# Patient Record
Sex: MALE | Race: WHITE | NOT HISPANIC OR LATINO | Employment: OTHER | URBAN - METROPOLITAN AREA
[De-identification: names, ages, dates, MRNs, and addresses within clinical notes are randomized per-mention and may not be internally consistent; named-entity substitution may affect disease eponyms.]

---

## 2023-02-10 PROBLEM — L30.9 ECZEMA: Status: ACTIVE | Noted: 2023-02-10

## 2023-02-10 PROBLEM — E55.9 VITAMIN D DEFICIENCY: Status: ACTIVE | Noted: 2023-02-10

## 2023-02-10 PROBLEM — K57.90 DIVERTICULOSIS: Status: ACTIVE | Noted: 2023-02-10

## 2023-02-10 PROBLEM — F32.1 DEPRESSION, MAJOR, SINGLE EPISODE, MODERATE (MULTI): Status: ACTIVE | Noted: 2023-02-10

## 2023-02-10 PROBLEM — M19.041 OSTEOARTHRITIS OF FINGER OF RIGHT HAND: Status: ACTIVE | Noted: 2023-02-10

## 2023-02-10 PROBLEM — Z86.11 HISTORY OF TB (TUBERCULOSIS): Status: ACTIVE | Noted: 2023-02-10

## 2023-02-10 PROBLEM — E78.5 HYPERLIPIDEMIA: Status: ACTIVE | Noted: 2023-02-10

## 2023-02-10 PROBLEM — L81.8 PERIORBITAL HYPERPIGMENTATION: Status: ACTIVE | Noted: 2023-02-10

## 2023-02-10 PROBLEM — K63.5 COLON POLYPS: Status: ACTIVE | Noted: 2023-02-10

## 2023-02-10 PROBLEM — R05.9 COUGH: Status: ACTIVE | Noted: 2023-02-10

## 2023-02-10 PROBLEM — M19.042 OSTEOARTHRITIS OF FINGER OF LEFT HAND: Status: ACTIVE | Noted: 2023-02-10

## 2023-02-10 PROBLEM — E03.9 HYPOTHYROIDISM: Status: ACTIVE | Noted: 2023-02-10

## 2023-02-10 PROBLEM — R73.03 PREDIABETES: Status: ACTIVE | Noted: 2023-02-10

## 2023-02-10 PROBLEM — I10 HYPERTENSION: Status: ACTIVE | Noted: 2023-02-10

## 2023-02-10 PROBLEM — H43.391 VITREOUS FLOATERS OF RIGHT EYE: Status: ACTIVE | Noted: 2023-02-10

## 2023-02-10 PROBLEM — L03.211 FACIAL CELLULITIS: Status: ACTIVE | Noted: 2023-02-10

## 2023-02-10 PROBLEM — G56.02 CARPAL TUNNEL SYNDROME OF LEFT WRIST: Status: ACTIVE | Noted: 2023-02-10

## 2023-02-10 PROBLEM — J30.9 ALLERGIC RHINITIS: Status: ACTIVE | Noted: 2023-02-10

## 2023-02-10 PROBLEM — M67.449 GANGLION CYST OF FINGER: Status: ACTIVE | Noted: 2023-02-10

## 2023-02-10 PROBLEM — G56.01 CARPAL TUNNEL SYNDROME OF RIGHT WRIST: Status: ACTIVE | Noted: 2023-02-10

## 2023-02-10 PROBLEM — M19.049 HAND ARTHRITIS: Status: ACTIVE | Noted: 2023-02-10

## 2023-02-10 RX ORDER — MELOXICAM 15 MG/1
1 TABLET ORAL DAILY
COMMUNITY
Start: 2021-02-04 | End: 2024-01-30 | Stop reason: SDUPTHER

## 2023-02-10 RX ORDER — CHOLECALCIFEROL (VITAMIN D3) 50 MCG
1 TABLET ORAL DAILY
COMMUNITY
Start: 2020-06-18

## 2023-02-10 RX ORDER — ALBUTEROL SULFATE 90 UG/1
1-2 AEROSOL, METERED RESPIRATORY (INHALATION) EVERY 6 HOURS PRN
COMMUNITY
Start: 2022-06-26 | End: 2024-02-08 | Stop reason: WASHOUT

## 2023-02-10 RX ORDER — IPRATROPIUM BROMIDE 42 UG/1
2 SPRAY, METERED NASAL
COMMUNITY
Start: 2022-07-12 | End: 2024-02-08 | Stop reason: WASHOUT

## 2023-02-10 RX ORDER — SERTRALINE HYDROCHLORIDE 50 MG/1
1 TABLET, FILM COATED ORAL DAILY
COMMUNITY
Start: 2020-06-18 | End: 2024-02-26 | Stop reason: SDUPTHER

## 2023-02-10 RX ORDER — LISINOPRIL 20 MG/1
1 TABLET ORAL DAILY
COMMUNITY
Start: 2020-03-06 | End: 2023-03-23 | Stop reason: SDUPTHER

## 2023-02-10 RX ORDER — SERTRALINE HYDROCHLORIDE 100 MG/1
1 TABLET, FILM COATED ORAL DAILY
COMMUNITY
Start: 2020-06-18

## 2023-02-10 RX ORDER — ATORVASTATIN CALCIUM 40 MG/1
1 TABLET, FILM COATED ORAL NIGHTLY
COMMUNITY
Start: 2019-12-30 | End: 2023-05-25 | Stop reason: SDUPTHER

## 2023-02-10 RX ORDER — LEVOTHYROXINE SODIUM 200 UG/1
200 TABLET ORAL
COMMUNITY
Start: 2022-07-07 | End: 2023-03-23 | Stop reason: SDUPTHER

## 2023-03-20 ENCOUNTER — TELEPHONE (OUTPATIENT)
Dept: PRIMARY CARE | Facility: CLINIC | Age: 68
End: 2023-03-20
Payer: MEDICARE

## 2023-03-20 DIAGNOSIS — R73.03 PREDIABETES: ICD-10-CM

## 2023-03-20 DIAGNOSIS — E78.2 MIXED HYPERLIPIDEMIA: Primary | ICD-10-CM

## 2023-03-20 DIAGNOSIS — E03.9 HYPOTHYROIDISM, UNSPECIFIED TYPE: ICD-10-CM

## 2023-03-20 DIAGNOSIS — I10 PRIMARY HYPERTENSION: ICD-10-CM

## 2023-03-20 DIAGNOSIS — E55.9 VITAMIN D DEFICIENCY: ICD-10-CM

## 2023-03-20 NOTE — TELEPHONE ENCOUNTER
Patient has an appointment for March 23. He called to see f you want labs drawn prior to this visit.

## 2023-03-20 NOTE — TELEPHONE ENCOUNTER
Yes labs ordered although he should have had orders from his last visit new orders have been placed they should be fasting and try to do them prior to his appointment.

## 2023-03-23 ENCOUNTER — OFFICE VISIT (OUTPATIENT)
Dept: PRIMARY CARE | Facility: CLINIC | Age: 68
End: 2023-03-23
Payer: MEDICARE

## 2023-03-23 VITALS
SYSTOLIC BLOOD PRESSURE: 113 MMHG | DIASTOLIC BLOOD PRESSURE: 74 MMHG | BODY MASS INDEX: 26.8 KG/M2 | WEIGHT: 187.2 LBS | RESPIRATION RATE: 17 BRPM | HEART RATE: 70 BPM | OXYGEN SATURATION: 96 % | TEMPERATURE: 97.6 F | HEIGHT: 70 IN

## 2023-03-23 DIAGNOSIS — I10 PRIMARY HYPERTENSION: ICD-10-CM

## 2023-03-23 DIAGNOSIS — E03.9 HYPOTHYROIDISM, UNSPECIFIED TYPE: ICD-10-CM

## 2023-03-23 DIAGNOSIS — Z23 ENCOUNTER FOR IMMUNIZATION: ICD-10-CM

## 2023-03-23 DIAGNOSIS — K42.9 UMBILICAL HERNIA WITHOUT OBSTRUCTION AND WITHOUT GANGRENE: ICD-10-CM

## 2023-03-23 DIAGNOSIS — Z00.00 MEDICARE ANNUAL WELLNESS VISIT, INITIAL: Primary | ICD-10-CM

## 2023-03-23 DIAGNOSIS — Z12.5 SCREENING FOR PROSTATE CANCER: ICD-10-CM

## 2023-03-23 DIAGNOSIS — R73.03 PREDIABETES: ICD-10-CM

## 2023-03-23 DIAGNOSIS — K43.2 INCISIONAL HERNIA, WITHOUT OBSTRUCTION OR GANGRENE: ICD-10-CM

## 2023-03-23 DIAGNOSIS — E78.2 MIXED HYPERLIPIDEMIA: ICD-10-CM

## 2023-03-23 DIAGNOSIS — F32.1 DEPRESSION, MAJOR, SINGLE EPISODE, MODERATE (MULTI): ICD-10-CM

## 2023-03-23 DIAGNOSIS — E55.9 VITAMIN D DEFICIENCY: ICD-10-CM

## 2023-03-23 PROBLEM — R05.9 COUGH: Status: RESOLVED | Noted: 2023-02-10 | Resolved: 2023-03-23

## 2023-03-23 PROBLEM — L03.211 FACIAL CELLULITIS: Status: RESOLVED | Noted: 2023-02-10 | Resolved: 2023-03-23

## 2023-03-23 PROCEDURE — 99214 OFFICE O/P EST MOD 30 MIN: CPT | Performed by: FAMILY MEDICINE

## 2023-03-23 PROCEDURE — 1170F FXNL STATUS ASSESSED: CPT | Performed by: FAMILY MEDICINE

## 2023-03-23 PROCEDURE — 3074F SYST BP LT 130 MM HG: CPT | Performed by: FAMILY MEDICINE

## 2023-03-23 PROCEDURE — 1159F MED LIST DOCD IN RCRD: CPT | Performed by: FAMILY MEDICINE

## 2023-03-23 PROCEDURE — G0009 ADMIN PNEUMOCOCCAL VACCINE: HCPCS | Performed by: FAMILY MEDICINE

## 2023-03-23 PROCEDURE — G0402 INITIAL PREVENTIVE EXAM: HCPCS | Performed by: FAMILY MEDICINE

## 2023-03-23 PROCEDURE — 90671 PCV15 VACCINE IM: CPT | Performed by: FAMILY MEDICINE

## 2023-03-23 PROCEDURE — 3078F DIAST BP <80 MM HG: CPT | Performed by: FAMILY MEDICINE

## 2023-03-23 PROCEDURE — 1036F TOBACCO NON-USER: CPT | Performed by: FAMILY MEDICINE

## 2023-03-23 PROCEDURE — 1160F RVW MEDS BY RX/DR IN RCRD: CPT | Performed by: FAMILY MEDICINE

## 2023-03-23 RX ORDER — LEVOTHYROXINE SODIUM 50 UG/1
TABLET ORAL EVERY 24 HOURS
COMMUNITY
End: 2023-03-23 | Stop reason: SDUPTHER

## 2023-03-23 RX ORDER — LEVOTHYROXINE SODIUM 200 UG/1
200 TABLET ORAL DAILY
Qty: 30 TABLET | Refills: 11 | Status: SHIPPED | OUTPATIENT
Start: 2023-03-23 | End: 2023-04-03 | Stop reason: ALTCHOICE

## 2023-03-23 RX ORDER — LEVOTHYROXINE SODIUM 50 UG/1
50 TABLET ORAL EVERY 24 HOURS
Qty: 30 TABLET | Refills: 11 | Status: SHIPPED | OUTPATIENT
Start: 2023-03-23 | End: 2023-04-03 | Stop reason: ALTCHOICE

## 2023-03-23 RX ORDER — LISINOPRIL 20 MG/1
20 TABLET ORAL DAILY
Qty: 30 TABLET | Refills: 11 | Status: SHIPPED | OUTPATIENT
Start: 2023-03-23 | End: 2024-02-08 | Stop reason: SDUPTHER

## 2023-03-23 ASSESSMENT — ACTIVITIES OF DAILY LIVING (ADL)
BATHING: INDEPENDENT
PREPARING MEALS: INDEPENDENT
BATHING: INDEPENDENT
DOING_HOUSEWORK: INDEPENDENT
PILL BOX USED: NO
USING TELEPHONE: INDEPENDENT
TAKING_MEDICATION: INDEPENDENT
EATING: INDEPENDENT
DRESSING: INDEPENDENT
MANAGING_FINANCES: INDEPENDENT
DRESSING: INDEPENDENT
GROCERY_SHOPPING: INDEPENDENT
ADEQUATE_TO_COMPLETE_ADL: YES
TOILETING: INDEPENDENT
FEEDING: INDEPENDENT
TAKING MEDICATION: INDEPENDENT
JUDGMENT_ADEQUATE_SAFELY_COMPLETE_DAILY_ACTIVITIES: YES
USING TRANSPORTATION: INDEPENDENT
STIL DRIVING: YES
DOING HOUSEWORK: INDEPENDENT
NEEDS ASSISTANCE WITH FOOD: INDEPENDENT
GROCERY SHOPPING: INDEPENDENT
MANAGING FINANCES: INDEPENDENT

## 2023-03-23 ASSESSMENT — ENCOUNTER SYMPTOMS
LOSS OF SENSATION IN FEET: 0
DEPRESSION: 0
OCCASIONAL FEELINGS OF UNSTEADINESS: 0
CONFUSION: 0
FEVER: 0
ARTHRALGIAS: 0
SHORTNESS OF BREATH: 0
CHILLS: 0
CHEST TIGHTNESS: 0
ABDOMINAL PAIN: 0
PALPITATIONS: 0

## 2023-03-23 ASSESSMENT — GERIATRIC MINI NUTRITIONAL ASSESSMENT (MNA)
E NEUROPSYCHOLOGICAL PROBLEMS: SEVERE DEMENTIA OR DEPRESSION
C GENERAL MOBILITY: BED OR CHAIR BOUND
D HAS SUFFERED PSYCHOLOGICAL STRESS OR ACUTE DISEASE IN THE PAST 3 MONTHS?: NO
B WEIGHT LOSS DURING THE LAST 3 MONTHS: WEIGHT LOSS GREATER THAN 3 KG (6.6 LBS)
A HAS FOOD INTAKE DECLINED OVER THE PAST 3 MONTHS DUE TO LOSS OF APPETITE, DIGESTIVE PROBLEMS, CHEWING OR SWALLOWING DIFFICULTIES?: SEVERE DECREASE IN FOOD INTAKE

## 2023-03-23 ASSESSMENT — PATIENT HEALTH QUESTIONNAIRE - PHQ9
2. FEELING DOWN, DEPRESSED OR HOPELESS: NOT AT ALL
SUM OF ALL RESPONSES TO PHQ9 QUESTIONS 1 AND 2: 0
1. LITTLE INTEREST OR PLEASURE IN DOING THINGS: NOT AT ALL

## 2023-03-23 ASSESSMENT — COLUMBIA-SUICIDE SEVERITY RATING SCALE - C-SSRS
1. IN THE PAST MONTH, HAVE YOU WISHED YOU WERE DEAD OR WISHED YOU COULD GO TO SLEEP AND NOT WAKE UP?: NO
2. HAVE YOU ACTUALLY HAD ANY THOUGHTS OF KILLING YOURSELF?: NO
6. HAVE YOU EVER DONE ANYTHING, STARTED TO DO ANYTHING, OR PREPARED TO DO ANYTHING TO END YOUR LIFE?: NO

## 2023-03-23 NOTE — PROGRESS NOTES
"Subjective   Reason for Visit: Sher Cameron is an 67 y.o. male here for a Medicare Wellness visit.     Past Medical, Surgical, and Family History reviewed and updated in chart.    Reviewed all medications by prescribing practitioner or clinical pharmacist (such as prescriptions, OTCs, herbal therapies and supplements) and documented in the medical record.    HPI patient today for follow-up of ongoing healthcare issues and review of recent lab work.  He states that he notices a bump right mid abdominal region.  Its been there for years.  Does not only bother him but he wanted to see what it might be.  Has not gotten any larger in size and does not cause him any pain.  Of note is he had previous surgery for a ruptured esophagus years ago and there is a vertical midline incision on the abdominal wall.    Patient Care Team:  Zach Angela MD as PCP - General  LENNY Durbin-CNP as PCP - [facility] Attributed Provider     Review of Systems   Constitutional:  Negative for chills and fever.   HENT:  Negative for congestion and ear pain.    Eyes:  Negative for visual disturbance.   Respiratory:  Negative for chest tightness and shortness of breath.    Cardiovascular:  Negative for chest pain and palpitations.   Gastrointestinal:  Negative for abdominal pain.   Musculoskeletal:  Negative for arthralgias.   Skin:  Negative for pallor.   Psychiatric/Behavioral:  Negative for confusion.        Objective   Vitals:  /74 (BP Location: Left arm, Patient Position: Sitting, BP Cuff Size: Large adult)   Pulse 70   Temp 36.4 °C (97.6 °F)   Resp 17   Ht 1.778 m (5' 10\")   Wt 84.9 kg (187 lb 3.2 oz)   SpO2 96%   BMI 26.86 kg/m²       Physical Exam  Vitals and nursing note reviewed.   Constitutional:       General: He is not in acute distress.     Appearance: Normal appearance. He is not ill-appearing.   HENT:      Head: Normocephalic and atraumatic.      Right Ear: Tympanic membrane, ear canal and external ear " normal.      Left Ear: Tympanic membrane, ear canal and external ear normal.      Mouth/Throat:      Pharynx: Oropharynx is clear.   Eyes:      Extraocular Movements: Extraocular movements intact.   Cardiovascular:      Rate and Rhythm: Normal rate and regular rhythm.      Pulses: Normal pulses.      Heart sounds: Normal heart sounds.   Pulmonary:      Effort: Pulmonary effort is normal.      Breath sounds: Normal breath sounds.   Abdominal:      General: Abdomen is flat. Bowel sounds are normal.      Palpations: Abdomen is soft. There is no mass.      Tenderness: There is no abdominal tenderness. There is no guarding or rebound.      Hernia: A hernia is present.      Comments: Easily reducible umbilical hernia  Just to the right of midline and adjacent to his incisional scar is not soft easily reducible mass.   Musculoskeletal:         General: Normal range of motion.      Cervical back: Neck supple.   Skin:     General: Skin is warm.   Neurological:      Mental Status: He is alert and oriented to person, place, and time. Mental status is at baseline.   Psychiatric:         Mood and Affect: Mood normal.       Return to office in 4 months with repeat fasting labs  Await further input from general surgery with regards to umbilical hernia and suspected abdominal wall/incisional hernia  Assessment/Plan   Problem List Items Addressed This Visit       Depression, major, single episode, moderate (CMS/HCC)    Current Assessment & Plan     Stable continue Zoloft 100 mg daily         Hyperlipidemia    Current Assessment & Plan     Stable continue Lipitor 40 mg daily         Relevant Orders    Comprehensive Metabolic Panel    Lipid Panel    Hypertension    Current Assessment & Plan     Stable continue current treatment         Relevant Medications    lisinopril 20 mg tablet    Other Relevant Orders    CBC    Comprehensive Metabolic Panel    Follow Up In Primary Care    Hypothyroidism    Current Assessment & Plan     Stable  continue current doses of levothyroxine for total of 250 mcg daily         Relevant Medications    levothyroxine (Synthroid, Levoxyl) 200 mcg tablet    levothyroxine (Synthroid, Levoxyl) 50 mcg tablet    Other Relevant Orders    TSH with reflex to Free T4 if abnormal    Prediabetes    Current Assessment & Plan     A1c stable continue dietary modifications         Relevant Orders    CBC    Comprehensive Metabolic Panel    Hemoglobin A1C    Follow Up In Primary Care    Vitamin D deficiency    Current Assessment & Plan     Continue to monitor and supplement         Relevant Orders    Vitamin D 1,25 Dihydroxy    Umbilical hernia without obstruction and without gangrene    Current Assessment & Plan     Refer to general surgeon for second opinion  We discussed signs to watch out for that would warrant immediate attention and visit to ER.         Relevant Orders    Referral to General Surgery    Incisional hernia, without obstruction or gangrene    Current Assessment & Plan     Refer to general surgeon for second opinion.  Call or go to the ER if anything should worsen.         Relevant Orders    Referral to General Surgery    Screening for prostate cancer    Current Assessment & Plan     Screening PSA with next lab draw         Relevant Orders    Prostate Specific Antigen, Screen    Encounter for immunization    Relevant Orders    Pneumococcal conjugate vaccine, 15-valent (VAXNEUVANCE) (Completed)    Medicare annual wellness visit, initial - Primary    Current Assessment & Plan     Prevnar 15x1 today  Pneumo 23 7/20/2021  Flu vaccine 11/18/2022  Tdap 7/20/2021  Colonoscopy 8/8/2019  PSA 7/6/2022

## 2023-03-24 NOTE — ASSESSMENT & PLAN NOTE
Refer to general surgeon for second opinion  We discussed signs to watch out for that would warrant immediate attention and visit to ER.

## 2023-03-24 NOTE — ASSESSMENT & PLAN NOTE
Prevnar 15x1 today  Pneumo 23 7/20/2021  Flu vaccine 11/18/2022  Tdap 7/20/2021  Colonoscopy 8/8/2019  PSA 7/6/2022

## 2023-03-30 ENCOUNTER — LAB (OUTPATIENT)
Dept: LAB | Facility: LAB | Age: 68
End: 2023-03-30
Payer: MEDICARE

## 2023-03-30 DIAGNOSIS — R73.03 PREDIABETES: ICD-10-CM

## 2023-03-30 DIAGNOSIS — I10 PRIMARY HYPERTENSION: ICD-10-CM

## 2023-03-30 DIAGNOSIS — E78.2 MIXED HYPERLIPIDEMIA: ICD-10-CM

## 2023-03-30 DIAGNOSIS — E55.9 VITAMIN D DEFICIENCY: ICD-10-CM

## 2023-03-30 DIAGNOSIS — E03.9 HYPOTHYROIDISM, UNSPECIFIED TYPE: ICD-10-CM

## 2023-03-30 LAB
ALANINE AMINOTRANSFERASE (SGPT) (U/L) IN SER/PLAS: 22 U/L (ref 10–52)
ALBUMIN (G/DL) IN SER/PLAS: 4 G/DL (ref 3.4–5)
ALKALINE PHOSPHATASE (U/L) IN SER/PLAS: 42 U/L (ref 33–136)
ANION GAP IN SER/PLAS: 7 MMOL/L (ref 10–20)
ASPARTATE AMINOTRANSFERASE (SGOT) (U/L) IN SER/PLAS: 19 U/L (ref 9–39)
BILIRUBIN TOTAL (MG/DL) IN SER/PLAS: 0.6 MG/DL (ref 0–1.2)
CALCIUM (MG/DL) IN SER/PLAS: 8.8 MG/DL (ref 8.6–10.3)
CARBON DIOXIDE, TOTAL (MMOL/L) IN SER/PLAS: 31 MMOL/L (ref 21–32)
CHLORIDE (MMOL/L) IN SER/PLAS: 108 MMOL/L (ref 98–107)
CHOLESTEROL (MG/DL) IN SER/PLAS: 129 MG/DL (ref 0–199)
CHOLESTEROL IN HDL (MG/DL) IN SER/PLAS: 44.8 MG/DL
CHOLESTEROL/HDL RATIO: 2.9
CREATININE (MG/DL) IN SER/PLAS: 0.87 MG/DL (ref 0.5–1.3)
ESTIMATED AVERAGE GLUCOSE FOR HBA1C: 123 MG/DL
GFR MALE: >90 ML/MIN/1.73M2
GLUCOSE (MG/DL) IN SER/PLAS: 92 MG/DL (ref 74–99)
HEMOGLOBIN A1C/HEMOGLOBIN TOTAL IN BLOOD: 5.9 %
LDL: 68 MG/DL (ref 0–99)
POTASSIUM (MMOL/L) IN SER/PLAS: 4.1 MMOL/L (ref 3.5–5.3)
PROTEIN TOTAL: 6.2 G/DL (ref 6.4–8.2)
SODIUM (MMOL/L) IN SER/PLAS: 142 MMOL/L (ref 136–145)
THYROTROPIN (MIU/L) IN SER/PLAS BY DETECTION LIMIT <= 0.05 MIU/L: 0.1 MIU/L (ref 0.44–3.98)
THYROXINE (T4) FREE (NG/DL) IN SER/PLAS: 1.03 NG/DL (ref 0.61–1.12)
TRIGLYCERIDE (MG/DL) IN SER/PLAS: 79 MG/DL (ref 0–149)
UREA NITROGEN (MG/DL) IN SER/PLAS: 16 MG/DL (ref 6–23)
VLDL: 16 MG/DL (ref 0–40)

## 2023-03-30 PROCEDURE — 80061 LIPID PANEL: CPT

## 2023-03-30 PROCEDURE — 84439 ASSAY OF FREE THYROXINE: CPT

## 2023-03-30 PROCEDURE — 84443 ASSAY THYROID STIM HORMONE: CPT

## 2023-03-30 PROCEDURE — 80053 COMPREHEN METABOLIC PANEL: CPT

## 2023-03-30 PROCEDURE — 82652 VIT D 1 25-DIHYDROXY: CPT

## 2023-03-30 PROCEDURE — 83036 HEMOGLOBIN GLYCOSYLATED A1C: CPT

## 2023-03-30 PROCEDURE — 36415 COLL VENOUS BLD VENIPUNCTURE: CPT

## 2023-03-31 ENCOUNTER — TELEPHONE (OUTPATIENT)
Dept: PRIMARY CARE | Facility: CLINIC | Age: 68
End: 2023-03-31
Payer: MEDICARE

## 2023-03-31 DIAGNOSIS — E03.9 HYPOTHYROIDISM, UNSPECIFIED TYPE: ICD-10-CM

## 2023-04-03 LAB — VITAMIN D 1,25-DIHYDROXY: 39.7 PG/ML (ref 19.9–79.3)

## 2023-04-03 RX ORDER — LEVOTHYROXINE SODIUM 175 UG/1
175 TABLET ORAL
Qty: 90 TABLET | Refills: 1 | Status: SHIPPED | OUTPATIENT
Start: 2023-04-03 | End: 2023-07-26 | Stop reason: DRUGHIGH

## 2023-04-03 RX ORDER — LEVOTHYROXINE SODIUM 175 UG/1
175 TABLET ORAL
COMMUNITY
End: 2023-04-03 | Stop reason: ALTCHOICE

## 2023-04-03 NOTE — TELEPHONE ENCOUNTER
Then based on recent lab results have him decrease his levothyroxine to 175 mcg once a day please pend new prescription.  And update medication list.  Must keep office appointment as scheduled with repeat lab work a week before.

## 2023-05-25 ENCOUNTER — TELEPHONE (OUTPATIENT)
Dept: PRIMARY CARE | Facility: CLINIC | Age: 68
End: 2023-05-25
Payer: MEDICARE

## 2023-05-25 DIAGNOSIS — E78.49 OTHER HYPERLIPIDEMIA: ICD-10-CM

## 2023-05-25 RX ORDER — ATORVASTATIN CALCIUM 40 MG/1
40 TABLET, FILM COATED ORAL NIGHTLY
Qty: 90 TABLET | Refills: 3 | Status: SHIPPED | OUTPATIENT
Start: 2023-05-25

## 2023-05-25 NOTE — TELEPHONE ENCOUNTER
Sher left a voicemail message for a refill on the following:    Atorvastatin Calcium 40mg 1 tablet at bedtime    [facility]: [facility]    Last seen: 3/23/2023  Future appointment: 7/26/2023

## 2023-07-25 ENCOUNTER — LAB (OUTPATIENT)
Dept: LAB | Facility: LAB | Age: 68
End: 2023-07-25
Payer: MEDICARE

## 2023-07-25 DIAGNOSIS — E03.9 HYPOTHYROIDISM, UNSPECIFIED TYPE: ICD-10-CM

## 2023-07-25 DIAGNOSIS — R73.03 PREDIABETES: ICD-10-CM

## 2023-07-25 DIAGNOSIS — E55.9 VITAMIN D DEFICIENCY: ICD-10-CM

## 2023-07-25 DIAGNOSIS — Z12.5 SCREENING FOR PROSTATE CANCER: ICD-10-CM

## 2023-07-25 DIAGNOSIS — E78.2 MIXED HYPERLIPIDEMIA: ICD-10-CM

## 2023-07-25 DIAGNOSIS — I10 PRIMARY HYPERTENSION: ICD-10-CM

## 2023-07-25 LAB
ALANINE AMINOTRANSFERASE (SGPT) (U/L) IN SER/PLAS: 23 U/L (ref 10–52)
ALBUMIN (G/DL) IN SER/PLAS: 4 G/DL (ref 3.4–5)
ALKALINE PHOSPHATASE (U/L) IN SER/PLAS: 40 U/L (ref 33–136)
ANION GAP IN SER/PLAS: 13 MMOL/L (ref 10–20)
ASPARTATE AMINOTRANSFERASE (SGOT) (U/L) IN SER/PLAS: 21 U/L (ref 9–39)
BILIRUBIN TOTAL (MG/DL) IN SER/PLAS: 0.6 MG/DL (ref 0–1.2)
CALCIUM (MG/DL) IN SER/PLAS: 8.6 MG/DL (ref 8.6–10.3)
CARBON DIOXIDE, TOTAL (MMOL/L) IN SER/PLAS: 26 MMOL/L (ref 21–32)
CHLORIDE (MMOL/L) IN SER/PLAS: 106 MMOL/L (ref 98–107)
CHOLESTEROL (MG/DL) IN SER/PLAS: 134 MG/DL (ref 0–199)
CHOLESTEROL IN HDL (MG/DL) IN SER/PLAS: 50.7 MG/DL
CHOLESTEROL/HDL RATIO: 2.6
CREATININE (MG/DL) IN SER/PLAS: 0.9 MG/DL (ref 0.5–1.3)
ERYTHROCYTE DISTRIBUTION WIDTH (RATIO) BY AUTOMATED COUNT: 14.1 % (ref 11.5–14.5)
ERYTHROCYTE MEAN CORPUSCULAR HEMOGLOBIN CONCENTRATION (G/DL) BY AUTOMATED: 32 G/DL (ref 32–36)
ERYTHROCYTE MEAN CORPUSCULAR VOLUME (FL) BY AUTOMATED COUNT: 82 FL (ref 80–100)
ERYTHROCYTES (10*6/UL) IN BLOOD BY AUTOMATED COUNT: 5.29 X10E12/L (ref 4.5–5.9)
ESTIMATED AVERAGE GLUCOSE FOR HBA1C: 131 MG/DL
GFR MALE: >90 ML/MIN/1.73M2
GLUCOSE (MG/DL) IN SER/PLAS: 91 MG/DL (ref 74–99)
HEMATOCRIT (%) IN BLOOD BY AUTOMATED COUNT: 43.5 % (ref 41–52)
HEMOGLOBIN (G/DL) IN BLOOD: 13.9 G/DL (ref 13.5–17.5)
HEMOGLOBIN A1C/HEMOGLOBIN TOTAL IN BLOOD: 6.2 %
LDL: 69 MG/DL (ref 0–99)
LEUKOCYTES (10*3/UL) IN BLOOD BY AUTOMATED COUNT: 5.1 X10E9/L (ref 4.4–11.3)
PLATELETS (10*3/UL) IN BLOOD AUTOMATED COUNT: 162 X10E9/L (ref 150–450)
POTASSIUM (MMOL/L) IN SER/PLAS: 4.2 MMOL/L (ref 3.5–5.3)
PROSTATE SPECIFIC ANTIGEN,SCREEN: 0.3 NG/ML (ref 0–4)
PROTEIN TOTAL: 6.1 G/DL (ref 6.4–8.2)
SODIUM (MMOL/L) IN SER/PLAS: 141 MMOL/L (ref 136–145)
THYROTROPIN (MIU/L) IN SER/PLAS BY DETECTION LIMIT <= 0.05 MIU/L: 0.13 MIU/L (ref 0.44–3.98)
THYROXINE (T4) FREE (NG/DL) IN SER/PLAS: 1.08 NG/DL (ref 0.61–1.12)
TRIGLYCERIDE (MG/DL) IN SER/PLAS: 74 MG/DL (ref 0–149)
UREA NITROGEN (MG/DL) IN SER/PLAS: 16 MG/DL (ref 6–23)
VLDL: 15 MG/DL (ref 0–40)

## 2023-07-25 PROCEDURE — 80053 COMPREHEN METABOLIC PANEL: CPT

## 2023-07-25 PROCEDURE — 83036 HEMOGLOBIN GLYCOSYLATED A1C: CPT

## 2023-07-25 PROCEDURE — G0103 PSA SCREENING: HCPCS

## 2023-07-25 PROCEDURE — 84439 ASSAY OF FREE THYROXINE: CPT

## 2023-07-25 PROCEDURE — 80061 LIPID PANEL: CPT

## 2023-07-25 PROCEDURE — 36415 COLL VENOUS BLD VENIPUNCTURE: CPT

## 2023-07-25 PROCEDURE — 82652 VIT D 1 25-DIHYDROXY: CPT

## 2023-07-25 PROCEDURE — 85027 COMPLETE CBC AUTOMATED: CPT

## 2023-07-25 PROCEDURE — 84443 ASSAY THYROID STIM HORMONE: CPT

## 2023-07-26 ENCOUNTER — OFFICE VISIT (OUTPATIENT)
Dept: PRIMARY CARE | Facility: CLINIC | Age: 68
End: 2023-07-26
Payer: MEDICARE

## 2023-07-26 VITALS
DIASTOLIC BLOOD PRESSURE: 78 MMHG | TEMPERATURE: 97.8 F | BODY MASS INDEX: 26.66 KG/M2 | OXYGEN SATURATION: 95 % | SYSTOLIC BLOOD PRESSURE: 121 MMHG | WEIGHT: 186.2 LBS | HEIGHT: 70 IN | HEART RATE: 67 BPM

## 2023-07-26 DIAGNOSIS — E78.2 MIXED HYPERLIPIDEMIA: ICD-10-CM

## 2023-07-26 DIAGNOSIS — F32.1 DEPRESSION, MAJOR, SINGLE EPISODE, MODERATE (MULTI): Primary | ICD-10-CM

## 2023-07-26 DIAGNOSIS — E55.9 VITAMIN D DEFICIENCY: ICD-10-CM

## 2023-07-26 DIAGNOSIS — R73.03 PREDIABETES: ICD-10-CM

## 2023-07-26 DIAGNOSIS — E03.8 OTHER SPECIFIED HYPOTHYROIDISM: ICD-10-CM

## 2023-07-26 DIAGNOSIS — L30.8 OTHER ECZEMA: ICD-10-CM

## 2023-07-26 DIAGNOSIS — I10 PRIMARY HYPERTENSION: ICD-10-CM

## 2023-07-26 PROCEDURE — 1160F RVW MEDS BY RX/DR IN RCRD: CPT | Performed by: FAMILY MEDICINE

## 2023-07-26 PROCEDURE — 1036F TOBACCO NON-USER: CPT | Performed by: FAMILY MEDICINE

## 2023-07-26 PROCEDURE — 3074F SYST BP LT 130 MM HG: CPT | Performed by: FAMILY MEDICINE

## 2023-07-26 PROCEDURE — 1159F MED LIST DOCD IN RCRD: CPT | Performed by: FAMILY MEDICINE

## 2023-07-26 PROCEDURE — 3078F DIAST BP <80 MM HG: CPT | Performed by: FAMILY MEDICINE

## 2023-07-26 PROCEDURE — 99214 OFFICE O/P EST MOD 30 MIN: CPT | Performed by: FAMILY MEDICINE

## 2023-07-26 RX ORDER — LEVOTHYROXINE SODIUM 150 UG/1
150 TABLET ORAL DAILY
Qty: 90 TABLET | Refills: 1 | Status: SHIPPED | OUTPATIENT
Start: 2023-07-26 | End: 2024-01-30 | Stop reason: SDUPTHER

## 2023-07-26 RX ORDER — TRIAMCINOLONE ACETONIDE 1 MG/G
CREAM TOPICAL 2 TIMES DAILY
Qty: 80 G | Refills: 0 | Status: SHIPPED | OUTPATIENT
Start: 2023-07-26

## 2023-07-26 ASSESSMENT — ENCOUNTER SYMPTOMS
CONFUSION: 0
ABDOMINAL PAIN: 0
FEVER: 0
PALPITATIONS: 0
SHORTNESS OF BREATH: 0
ARTHRALGIAS: 0
CHEST TIGHTNESS: 0
CHILLS: 0

## 2023-07-26 NOTE — PROGRESS NOTES
"Subjective   Patient ID: Sher Cameron is a 68 y.o. male who presents for Follow-up (4 months with labs).    HPI   Patient today for follow-up of ongoing healthcare issues and review of blood work.  He states he never made the appointment with a general surgeon needed the phone number to call to schedule.  Also he says he recently went to urgent care with itchy rash lateral right periorbital region.  He says he was told to use over-the-counter cortisone cream which she has been using but not getting much results.  He denies any other visual or ocular symptoms.  Review of Systems   Constitutional:  Negative for chills and fever.   HENT:  Negative for congestion and ear pain.    Eyes:  Negative for visual disturbance.   Respiratory:  Negative for chest tightness and shortness of breath.    Cardiovascular:  Negative for chest pain and palpitations.   Gastrointestinal:  Negative for abdominal pain.   Musculoskeletal:  Negative for arthralgias.   Skin:  Positive for rash. Negative for pallor.   Psychiatric/Behavioral:  Negative for confusion.        Objective   /78   Pulse 67   Temp 36.6 °C (97.8 °F)   Ht 1.778 m (5' 10\")   Wt 84.5 kg (186 lb 3.2 oz)   SpO2 95%   BMI 26.72 kg/m²     Physical Exam  Vitals and nursing note reviewed.   Constitutional:       General: He is not in acute distress.     Appearance: Normal appearance. He is not ill-appearing.   HENT:      Head: Normocephalic and atraumatic.      Right Ear: Tympanic membrane, ear canal and external ear normal.      Left Ear: Tympanic membrane, ear canal and external ear normal.      Mouth/Throat:      Pharynx: Oropharynx is clear.   Eyes:      Extraocular Movements: Extraocular movements intact.   Cardiovascular:      Rate and Rhythm: Normal rate and regular rhythm.      Pulses: Normal pulses.      Heart sounds: Normal heart sounds.   Pulmonary:      Effort: Pulmonary effort is normal.      Breath sounds: Normal breath sounds.   Abdominal:      " General: Abdomen is flat. Bowel sounds are normal.      Palpations: Abdomen is soft.      Tenderness: There is no abdominal tenderness.      Hernia: A hernia is present.   Musculoskeletal:         General: Normal range of motion.      Cervical back: Neck supple.   Skin:     General: Skin is warm.      Findings: Rash present.      Comments: Mild dry flaky skin noted lateral right periorbital region.  No definitive lesions.   Neurological:      Mental Status: He is alert and oriented to person, place, and time. Mental status is at baseline.   Psychiatric:         Mood and Affect: Mood normal.       Recent Results (from the past 1008 hour(s))   CBC    Collection Time: 07/25/23  8:55 AM   Result Value Ref Range    WBC 5.1 4.4 - 11.3 x10E9/L    RBC 5.29 4.50 - 5.90 x10E12/L    Hemoglobin 13.9 13.5 - 17.5 g/dL    Hematocrit 43.5 41.0 - 52.0 %    MCV 82 80 - 100 fL    MCHC 32.0 32.0 - 36.0 g/dL    Platelets 162 150 - 450 x10E9/L    RDW 14.1 11.5 - 14.5 %   Comprehensive Metabolic Panel    Collection Time: 07/25/23  8:55 AM   Result Value Ref Range    Glucose 91 74 - 99 mg/dL    Sodium 141 136 - 145 mmol/L    Potassium 4.2 3.5 - 5.3 mmol/L    Chloride 106 98 - 107 mmol/L    Bicarbonate 26 21 - 32 mmol/L    Anion Gap 13 10 - 20 mmol/L    Urea Nitrogen 16 6 - 23 mg/dL    Creatinine 0.90 0.50 - 1.30 mg/dL    GFR MALE >90 >90 mL/min/1.73m2    Calcium 8.6 8.6 - 10.3 mg/dL    Albumin 4.0 3.4 - 5.0 g/dL    Alkaline Phosphatase 40 33 - 136 U/L    Total Protein 6.1 (L) 6.4 - 8.2 g/dL    AST 21 9 - 39 U/L    Total Bilirubin 0.6 0.0 - 1.2 mg/dL    ALT (SGPT) 23 10 - 52 U/L   Hemoglobin A1C    Collection Time: 07/25/23  8:55 AM   Result Value Ref Range    Hemoglobin A1C 6.2 (A) %    Estimated Average Glucose 131 MG/DL   Lipid Panel    Collection Time: 07/25/23  8:55 AM   Result Value Ref Range    Cholesterol 134 0 - 199 mg/dL    HDL 50.7 mg/dL    Cholesterol/HDL Ratio 2.6     LDL 69 0 - 99 mg/dL    VLDL 15 0 - 40 mg/dL    Triglycerides  74 0 - 149 mg/dL   TSH with reflex to Free T4 if abnormal    Collection Time: 07/25/23  8:55 AM   Result Value Ref Range    TSH 0.13 (L) 0.44 - 3.98 mIU/L   Prostate Specific Antigen, Screen    Collection Time: 07/25/23  8:55 AM   Result Value Ref Range    Prostate Specific Antigen,Screen 0.30 0.00 - 4.00 ng/mL   Thyroxine, Free    Collection Time: 07/25/23  8:55 AM   Result Value Ref Range    Free T4 1.08 0.61 - 1.12 ng/dL     Recent labs reviewed with patient  Necessary refills provided  Patient encouraged to call make appointment with general surgeon for evaluation of his hernias  Call if any questions or concerns if periorbital rash does not resolve in 2 to 3 weeks notify office we will consider dermatology referral.  Return to office 4 months with repeat fasting labs    Assessment/Plan   Problem List Items Addressed This Visit       Depression, major, single episode, moderate (CMS/HCC) - Primary     Stable continue Zoloft         Relevant Orders    Follow Up In Primary Care - Established    Eczema     Kenalog 0.1% cream apply thin layer twice a day.  If symptoms do not resolve in 2 to 3 weeks notify office we will consider dermatology referral.         Relevant Medications    triamcinolone (Kenalog) 0.1 % cream    Hyperlipidemia     Continue Lipitor 40 mg daily and dietary modification         Relevant Orders    Follow Up In Primary Care - Established    Lipid Panel    Comprehensive Metabolic Panel    Hypertension     Stable continue lisinopril 20 mg daily         Relevant Orders    Follow Up In Primary Care - Established    Comprehensive Metabolic Panel    Hypothyroidism     TSH still slightly decrease decrease levothyroxine to 150 mcg daily         Relevant Medications    levothyroxine (Synthroid, Levoxyl) 150 mcg tablet    Other Relevant Orders    Follow Up In Primary Care - Established    Prediabetes     A1c 6.2% reviewed diabetic diet         Relevant Orders    Follow Up In Primary Care - Established     Hemoglobin A1C    Comprehensive Metabolic Panel    Vitamin D deficiency     Continue to monitor supplement as needed

## 2023-07-26 NOTE — ASSESSMENT & PLAN NOTE
Kenalog 0.1% cream apply thin layer twice a day.  If symptoms do not resolve in 2 to 3 weeks notify office we will consider dermatology referral.

## 2023-07-28 LAB — VITAMIN D 1,25-DIHYDROXY: 49.4 PG/ML (ref 19.9–79.3)

## 2024-01-29 ENCOUNTER — TELEPHONE (OUTPATIENT)
Dept: PRIMARY CARE | Facility: CLINIC | Age: 69
End: 2024-01-29
Payer: MEDICARE

## 2024-01-29 DIAGNOSIS — M19.049 HAND ARTHRITIS: ICD-10-CM

## 2024-01-29 DIAGNOSIS — E03.8 OTHER SPECIFIED HYPOTHYROIDISM: ICD-10-CM

## 2024-01-29 NOTE — TELEPHONE ENCOUNTER
Rx Refill Request Telephone Encounter    Name:  Sher Cameron  :  [id number]  Medication Name:      levothyroxine (Synthroid, Levoxyl) 150 mcg tablet  Route: Take 1 tablet (150 mcg) by mouth once daily     meloxicam (Mobic) 15 mg tablet   Route: Take 1 tablet (15 mg) by mouth once daily.    Specific Pharmacy location:  [id number] - [place], [place] - [address]   Date of last appointment: 23   Date of next appointment:    Best number to reach patient: [phone]

## 2024-01-30 RX ORDER — LEVOTHYROXINE SODIUM 150 UG/1
150 TABLET ORAL DAILY
Qty: 30 TABLET | Refills: 0 | Status: SHIPPED | OUTPATIENT
Start: 2024-01-30 | End: 2024-02-26 | Stop reason: SDUPTHER

## 2024-01-30 RX ORDER — MELOXICAM 15 MG/1
15 TABLET ORAL DAILY
Qty: 30 TABLET | Refills: 0 | Status: SHIPPED | OUTPATIENT
Start: 2024-01-30 | End: 2024-02-26 | Stop reason: SDUPTHER

## 2024-02-07 ENCOUNTER — LAB (OUTPATIENT)
Dept: LAB | Facility: LAB | Age: 69
End: 2024-02-07
Payer: MEDICARE

## 2024-02-07 DIAGNOSIS — R73.03 PREDIABETES: ICD-10-CM

## 2024-02-07 DIAGNOSIS — I10 PRIMARY HYPERTENSION: ICD-10-CM

## 2024-02-07 DIAGNOSIS — E78.2 MIXED HYPERLIPIDEMIA: ICD-10-CM

## 2024-02-07 LAB
ALBUMIN SERPL BCP-MCNC: 3.9 G/DL (ref 3.4–5)
ALP SERPL-CCNC: 40 U/L (ref 33–136)
ALT SERPL W P-5'-P-CCNC: 26 U/L (ref 10–52)
ANION GAP SERPL CALC-SCNC: 9 MMOL/L (ref 10–20)
AST SERPL W P-5'-P-CCNC: 23 U/L (ref 9–39)
BILIRUB SERPL-MCNC: 0.5 MG/DL (ref 0–1.2)
BUN SERPL-MCNC: 18 MG/DL (ref 6–23)
CALCIUM SERPL-MCNC: 8.9 MG/DL (ref 8.6–10.3)
CHLORIDE SERPL-SCNC: 103 MMOL/L (ref 98–107)
CHOLEST SERPL-MCNC: 132 MG/DL (ref 0–199)
CHOLESTEROL/HDL RATIO: 2.5
CO2 SERPL-SCNC: 30 MMOL/L (ref 21–32)
CREAT SERPL-MCNC: 0.96 MG/DL (ref 0.5–1.3)
EGFRCR SERPLBLD CKD-EPI 2021: 86 ML/MIN/1.73M*2
EST. AVERAGE GLUCOSE BLD GHB EST-MCNC: 128 MG/DL
GLUCOSE SERPL-MCNC: 90 MG/DL (ref 74–99)
HBA1C MFR BLD: 6.1 %
HDLC SERPL-MCNC: 52.4 MG/DL
LDLC SERPL CALC-MCNC: 64 MG/DL
NON HDL CHOLESTEROL: 80 MG/DL (ref 0–149)
POTASSIUM SERPL-SCNC: 4.3 MMOL/L (ref 3.5–5.3)
PROT SERPL-MCNC: 6.5 G/DL (ref 6.4–8.2)
SODIUM SERPL-SCNC: 138 MMOL/L (ref 136–145)
TRIGL SERPL-MCNC: 79 MG/DL (ref 0–149)
VLDL: 16 MG/DL (ref 0–40)

## 2024-02-07 PROCEDURE — 83036 HEMOGLOBIN GLYCOSYLATED A1C: CPT

## 2024-02-07 PROCEDURE — 80061 LIPID PANEL: CPT

## 2024-02-07 PROCEDURE — 80053 COMPREHEN METABOLIC PANEL: CPT

## 2024-02-07 PROCEDURE — 36415 COLL VENOUS BLD VENIPUNCTURE: CPT

## 2024-02-08 ENCOUNTER — OFFICE VISIT (OUTPATIENT)
Dept: PRIMARY CARE | Facility: CLINIC | Age: 69
End: 2024-02-08
Payer: COMMERCIAL

## 2024-02-08 VITALS
OXYGEN SATURATION: 98 % | BODY MASS INDEX: 26.54 KG/M2 | TEMPERATURE: 97.5 F | SYSTOLIC BLOOD PRESSURE: 120 MMHG | HEART RATE: 66 BPM | DIASTOLIC BLOOD PRESSURE: 80 MMHG | RESPIRATION RATE: 14 BRPM | WEIGHT: 185 LBS

## 2024-02-08 DIAGNOSIS — E55.9 VITAMIN D DEFICIENCY: ICD-10-CM

## 2024-02-08 DIAGNOSIS — E78.49 OTHER HYPERLIPIDEMIA: ICD-10-CM

## 2024-02-08 DIAGNOSIS — Z12.5 SCREENING FOR PROSTATE CANCER: ICD-10-CM

## 2024-02-08 DIAGNOSIS — R73.03 PREDIABETES: ICD-10-CM

## 2024-02-08 DIAGNOSIS — M19.049 HAND ARTHRITIS: ICD-10-CM

## 2024-02-08 DIAGNOSIS — I10 PRIMARY HYPERTENSION: Primary | ICD-10-CM

## 2024-02-08 DIAGNOSIS — E03.8 OTHER SPECIFIED HYPOTHYROIDISM: ICD-10-CM

## 2024-02-08 PROCEDURE — 1160F RVW MEDS BY RX/DR IN RCRD: CPT | Performed by: FAMILY MEDICINE

## 2024-02-08 PROCEDURE — 3074F SYST BP LT 130 MM HG: CPT | Performed by: FAMILY MEDICINE

## 2024-02-08 PROCEDURE — 1158F ADVNC CARE PLAN TLK DOCD: CPT | Performed by: FAMILY MEDICINE

## 2024-02-08 PROCEDURE — 3079F DIAST BP 80-89 MM HG: CPT | Performed by: FAMILY MEDICINE

## 2024-02-08 PROCEDURE — 99214 OFFICE O/P EST MOD 30 MIN: CPT | Performed by: FAMILY MEDICINE

## 2024-02-08 PROCEDURE — 1123F ACP DISCUSS/DSCN MKR DOCD: CPT | Performed by: FAMILY MEDICINE

## 2024-02-08 PROCEDURE — 1159F MED LIST DOCD IN RCRD: CPT | Performed by: FAMILY MEDICINE

## 2024-02-08 PROCEDURE — 1036F TOBACCO NON-USER: CPT | Performed by: FAMILY MEDICINE

## 2024-02-08 RX ORDER — LISINOPRIL 20 MG/1
20 TABLET ORAL DAILY
Qty: 90 TABLET | Refills: 3 | Status: SHIPPED | OUTPATIENT
Start: 2024-02-08 | End: 2025-02-07

## 2024-02-08 RX ORDER — KETOROLAC TROMETHAMINE 5 MG/ML
SOLUTION OPHTHALMIC
COMMUNITY
Start: 2024-01-18

## 2024-02-08 RX ORDER — PREDNISOLONE ACETATE 10 MG/ML
SUSPENSION/ DROPS OPHTHALMIC
COMMUNITY
Start: 2024-01-18

## 2024-02-08 RX ORDER — MOXIFLOXACIN 5 MG/ML
SOLUTION/ DROPS OPHTHALMIC
COMMUNITY
Start: 2024-01-18

## 2024-02-08 ASSESSMENT — ENCOUNTER SYMPTOMS
FEVER: 0
CHILLS: 0
CHEST TIGHTNESS: 0
ARTHRALGIAS: 1
CONFUSION: 0
PALPITATIONS: 0
ABDOMINAL PAIN: 0
SHORTNESS OF BREATH: 0

## 2024-02-08 NOTE — PROGRESS NOTES
Subjective   Patient ID: Sher Cameron is a 68 y.o. male who presents for Follow-up (labs).    HPI patient today for follow-up of ongoing healthcare issues and review blood work for most part been doing okay since he has been in last he is undergoing cataract surgery says there is been a big improvement in his vision as well as the intensity of colors that he can see.  He is pleased with his results.  Has ongoing problems with arthritis though mainly in his hands especially when the weather changes the meloxicam does help.    Review of Systems   Constitutional:  Negative for chills and fever.   HENT:  Negative for congestion and ear pain.    Eyes:  Negative for visual disturbance.   Respiratory:  Negative for chest tightness and shortness of breath.    Cardiovascular:  Negative for chest pain and palpitations.   Gastrointestinal:  Negative for abdominal pain.   Musculoskeletal:  Positive for arthralgias.   Skin:  Negative for pallor.   Psychiatric/Behavioral:  Negative for confusion.        Objective   /80   Pulse 66   Temp 36.4 °C (97.5 °F)   Resp 14   Wt 83.9 kg (185 lb)   SpO2 98%   BMI 26.54 kg/m²     Physical Exam  Vitals and nursing note reviewed.   Constitutional:       General: He is not in acute distress.     Appearance: Normal appearance. He is not ill-appearing.   HENT:      Head: Normocephalic and atraumatic.      Right Ear: Tympanic membrane, ear canal and external ear normal.      Left Ear: Tympanic membrane, ear canal and external ear normal.      Mouth/Throat:      Pharynx: Oropharynx is clear.   Eyes:      Extraocular Movements: Extraocular movements intact.   Cardiovascular:      Rate and Rhythm: Normal rate and regular rhythm.      Pulses: Normal pulses.      Heart sounds: Normal heart sounds.   Pulmonary:      Effort: Pulmonary effort is normal.      Breath sounds: Normal breath sounds.   Abdominal:      General: Abdomen is flat. Bowel sounds are normal.      Palpations: Abdomen is  soft.      Tenderness: There is no abdominal tenderness.   Musculoskeletal:         General: Normal range of motion.      Cervical back: Neck supple.      Comments: Arthritic changes involving hands and fingers   Skin:     General: Skin is warm.   Neurological:      Mental Status: He is alert and oriented to person, place, and time. Mental status is at baseline.   Psychiatric:         Mood and Affect: Mood normal.       Recent Results (from the past 1008 hour(s))   Hemoglobin A1C    Collection Time: 02/07/24  9:19 AM   Result Value Ref Range    Hemoglobin A1C 6.1 (H) see below %    Estimated Average Glucose 128 Not Established mg/dL   Lipid Panel    Collection Time: 02/07/24  9:19 AM   Result Value Ref Range    Cholesterol 132 0 - 199 mg/dL    HDL-Cholesterol 52.4 mg/dL    Cholesterol/HDL Ratio 2.5     LDL Calculated 64 <=99 mg/dL    VLDL 16 0 - 40 mg/dL    Triglycerides 79 0 - 149 mg/dL    Non HDL Cholesterol 80 0 - 149 mg/dL   Comprehensive Metabolic Panel    Collection Time: 02/07/24  9:19 AM   Result Value Ref Range    Glucose 90 74 - 99 mg/dL    Sodium 138 136 - 145 mmol/L    Potassium 4.3 3.5 - 5.3 mmol/L    Chloride 103 98 - 107 mmol/L    Bicarbonate 30 21 - 32 mmol/L    Anion Gap 9 (L) 10 - 20 mmol/L    Urea Nitrogen 18 6 - 23 mg/dL    Creatinine 0.96 0.50 - 1.30 mg/dL    eGFR 86 >60 mL/min/1.73m*2    Calcium 8.9 8.6 - 10.3 mg/dL    Albumin 3.9 3.4 - 5.0 g/dL    Alkaline Phosphatase 40 33 - 136 U/L    Total Protein 6.5 6.4 - 8.2 g/dL    AST 23 9 - 39 U/L    Bilirubin, Total 0.5 0.0 - 1.2 mg/dL    ALT 26 10 - 52 U/L     Recent labs reviewed with patient overall numbers are stable he will continue current medications refill lisinopril.  Work on following a low-fat low-cholesterol diabetic diet    We discussed that end of the summer he will be due to update his colonoscopy    We discussed recommendations with regards to RSV    Return end of June for Medicare wellness visit along with fasting lab  work.    Assessment/Plan   Problem List Items Addressed This Visit             ICD-10-CM    Hand arthritis M19.049     Continue meloxicam may supplement with over-the-counter Tylenol if needed         Hyperlipidemia E78.5     Stable continue Lipitor 40 mg daily         Relevant Orders    Lipid Panel    Comprehensive Metabolic Panel    Cholesterol, LDL Direct    Follow Up In Primary Care - Established    Hypertension - Primary I10     Stable continue current medication         Relevant Medications    lisinopril 20 mg tablet    Other Relevant Orders    Comprehensive Metabolic Panel    CBC    Follow Up In Primary Care - Established    Hypothyroidism E03.9     Check TSH with next lab draw continue levothyroxine         Relevant Orders    TSH with reflex to Free T4 if abnormal    Comprehensive Metabolic Panel    Follow Up In Primary Care - Established    Prediabetes R73.03     A1c 6.1% continue dietary modifications         Relevant Orders    Hemoglobin A1C    Comprehensive Metabolic Panel    Follow Up In Primary Care - Established    Vitamin D deficiency E55.9     Continue to monitor supplement as needed         Relevant Orders    Vitamin D 25-Hydroxy,Total (for eval of Vitamin D levels)    Screening for prostate cancer Z12.5     Screening PSA         Relevant Orders    Prostate Specific Antigen, Screen

## 2024-02-26 ENCOUNTER — TELEPHONE (OUTPATIENT)
Dept: PRIMARY CARE | Facility: CLINIC | Age: 69
End: 2024-02-26
Payer: COMMERCIAL

## 2024-02-26 DIAGNOSIS — F32.1 DEPRESSION, MAJOR, SINGLE EPISODE, MODERATE (MULTI): ICD-10-CM

## 2024-02-26 DIAGNOSIS — M19.049 HAND ARTHRITIS: ICD-10-CM

## 2024-02-26 DIAGNOSIS — E03.8 OTHER SPECIFIED HYPOTHYROIDISM: ICD-10-CM

## 2024-02-26 RX ORDER — SERTRALINE HYDROCHLORIDE 50 MG/1
50 TABLET, FILM COATED ORAL DAILY
Qty: 90 TABLET | Refills: 3 | Status: SHIPPED | OUTPATIENT
Start: 2024-02-26

## 2024-02-26 RX ORDER — MELOXICAM 15 MG/1
15 TABLET ORAL DAILY
Qty: 90 TABLET | Refills: 3 | Status: SHIPPED | OUTPATIENT
Start: 2024-02-26

## 2024-02-26 RX ORDER — LEVOTHYROXINE SODIUM 150 UG/1
150 TABLET ORAL DAILY
Qty: 90 TABLET | Refills: 3 | Status: SHIPPED | OUTPATIENT
Start: 2024-02-26 | End: 2025-02-20

## 2024-02-26 NOTE — TELEPHONE ENCOUNTER
Rx Refill Request Telephone Encounter    Name:  Sher Cameron  :  [id number]  Medication Name:      meloxicam (Mobic) 15 mg tablet   Route: Take 1 tablet (15 mg) by mouth once daily    sertraline (Zoloft) 50 mg tablet   Route: Take 1 tablet (50 mg) by mouth once daily.      levothyroxine (Synthroid, Levoxyl) 150 mcg tablet   Route: Take 1 tablet (150 mcg) by mouth once daily.            Specific Pharmacy location:  [facility] [place] [address] [address]   Date of last appointment:  24  Date of next appointment: 24   Best number to reach patient: [phone]

## 2024-06-25 ENCOUNTER — LAB (OUTPATIENT)
Dept: LAB | Facility: LAB | Age: 69
End: 2024-06-25
Payer: COMMERCIAL

## 2024-06-25 DIAGNOSIS — Z12.5 SCREENING FOR PROSTATE CANCER: ICD-10-CM

## 2024-06-25 DIAGNOSIS — E78.49 OTHER HYPERLIPIDEMIA: ICD-10-CM

## 2024-06-25 DIAGNOSIS — R73.03 PREDIABETES: ICD-10-CM

## 2024-06-25 DIAGNOSIS — I10 PRIMARY HYPERTENSION: ICD-10-CM

## 2024-06-25 DIAGNOSIS — E03.8 OTHER SPECIFIED HYPOTHYROIDISM: ICD-10-CM

## 2024-06-25 DIAGNOSIS — E55.9 VITAMIN D DEFICIENCY: ICD-10-CM

## 2024-06-25 LAB
25(OH)D3 SERPL-MCNC: 51 NG/ML (ref 30–100)
ALBUMIN SERPL BCP-MCNC: 4 G/DL (ref 3.4–5)
ALP SERPL-CCNC: 37 U/L (ref 33–136)
ALT SERPL W P-5'-P-CCNC: 27 U/L (ref 10–52)
ANION GAP SERPL CALC-SCNC: 10 MMOL/L (ref 10–20)
AST SERPL W P-5'-P-CCNC: 23 U/L (ref 9–39)
BILIRUB SERPL-MCNC: 0.6 MG/DL (ref 0–1.2)
BUN SERPL-MCNC: 19 MG/DL (ref 6–23)
CALCIUM SERPL-MCNC: 8.8 MG/DL (ref 8.6–10.3)
CHLORIDE SERPL-SCNC: 105 MMOL/L (ref 98–107)
CHOLEST SERPL-MCNC: 130 MG/DL (ref 0–199)
CHOLESTEROL/HDL RATIO: 2.5
CO2 SERPL-SCNC: 29 MMOL/L (ref 21–32)
CREAT SERPL-MCNC: 0.9 MG/DL (ref 0.5–1.3)
EGFRCR SERPLBLD CKD-EPI 2021: >90 ML/MIN/1.73M*2
ERYTHROCYTE [DISTWIDTH] IN BLOOD BY AUTOMATED COUNT: 13.7 % (ref 11.5–14.5)
EST. AVERAGE GLUCOSE BLD GHB EST-MCNC: 120 MG/DL
GLUCOSE SERPL-MCNC: 89 MG/DL (ref 74–99)
HBA1C MFR BLD: 5.8 %
HCT VFR BLD AUTO: 44.4 % (ref 41–52)
HDLC SERPL-MCNC: 52.6 MG/DL
HGB BLD-MCNC: 14.4 G/DL (ref 13.5–17.5)
LDLC SERPL CALC-MCNC: 64 MG/DL
LDLC SERPL DIRECT ASSAY-MCNC: 75 MG/DL (ref 0–129)
MCH RBC QN AUTO: 26.9 PG (ref 26–34)
MCHC RBC AUTO-ENTMCNC: 32.4 G/DL (ref 32–36)
MCV RBC AUTO: 83 FL (ref 80–100)
NON HDL CHOLESTEROL: 77 MG/DL (ref 0–149)
NRBC BLD-RTO: 0 /100 WBCS (ref 0–0)
PLATELET # BLD AUTO: 172 X10*3/UL (ref 150–450)
POTASSIUM SERPL-SCNC: 4.4 MMOL/L (ref 3.5–5.3)
PROT SERPL-MCNC: 6.1 G/DL (ref 6.4–8.2)
PSA SERPL-MCNC: 0.45 NG/ML
RBC # BLD AUTO: 5.36 X10*6/UL (ref 4.5–5.9)
SODIUM SERPL-SCNC: 140 MMOL/L (ref 136–145)
TRIGL SERPL-MCNC: 66 MG/DL (ref 0–149)
TSH SERPL-ACNC: 1.66 MIU/L (ref 0.44–3.98)
VLDL: 13 MG/DL (ref 0–40)
WBC # BLD AUTO: 5.3 X10*3/UL (ref 4.4–11.3)

## 2024-06-25 PROCEDURE — 82306 VITAMIN D 25 HYDROXY: CPT

## 2024-06-25 PROCEDURE — 36415 COLL VENOUS BLD VENIPUNCTURE: CPT

## 2024-06-25 PROCEDURE — 84443 ASSAY THYROID STIM HORMONE: CPT

## 2024-06-25 PROCEDURE — 85027 COMPLETE CBC AUTOMATED: CPT

## 2024-06-25 PROCEDURE — G0103 PSA SCREENING: HCPCS

## 2024-06-25 PROCEDURE — 80061 LIPID PANEL: CPT

## 2024-06-25 PROCEDURE — 83036 HEMOGLOBIN GLYCOSYLATED A1C: CPT

## 2024-06-25 PROCEDURE — 80053 COMPREHEN METABOLIC PANEL: CPT

## 2024-06-25 PROCEDURE — 83721 ASSAY OF BLOOD LIPOPROTEIN: CPT

## 2024-06-26 ENCOUNTER — APPOINTMENT (OUTPATIENT)
Dept: PRIMARY CARE | Facility: CLINIC | Age: 69
End: 2024-06-26
Payer: COMMERCIAL

## 2024-06-26 ENCOUNTER — HOSPITAL ENCOUNTER (OUTPATIENT)
Dept: RADIOLOGY | Facility: CLINIC | Age: 69
Discharge: HOME | End: 2024-06-26
Payer: COMMERCIAL

## 2024-06-26 VITALS
DIASTOLIC BLOOD PRESSURE: 69 MMHG | BODY MASS INDEX: 26.05 KG/M2 | SYSTOLIC BLOOD PRESSURE: 119 MMHG | RESPIRATION RATE: 14 BRPM | WEIGHT: 182 LBS | TEMPERATURE: 97.2 F | HEIGHT: 70 IN | HEART RATE: 64 BPM | OXYGEN SATURATION: 97 %

## 2024-06-26 DIAGNOSIS — M67.449 GANGLION CYST OF FINGER: ICD-10-CM

## 2024-06-26 DIAGNOSIS — I10 PRIMARY HYPERTENSION: ICD-10-CM

## 2024-06-26 DIAGNOSIS — M19.049 HAND ARTHRITIS: ICD-10-CM

## 2024-06-26 DIAGNOSIS — E78.49 OTHER HYPERLIPIDEMIA: ICD-10-CM

## 2024-06-26 DIAGNOSIS — Z00.00 ANNUAL PHYSICAL EXAM: ICD-10-CM

## 2024-06-26 DIAGNOSIS — F32.1 DEPRESSION, MAJOR, SINGLE EPISODE, MODERATE (MULTI): ICD-10-CM

## 2024-06-26 DIAGNOSIS — R73.03 PREDIABETES: ICD-10-CM

## 2024-06-26 DIAGNOSIS — M65.342 TRIGGER RING FINGER OF LEFT HAND: ICD-10-CM

## 2024-06-26 DIAGNOSIS — M65.341 TRIGGER RING FINGER OF RIGHT HAND: ICD-10-CM

## 2024-06-26 DIAGNOSIS — Z00.00 MEDICARE ANNUAL WELLNESS VISIT, SUBSEQUENT: Primary | ICD-10-CM

## 2024-06-26 DIAGNOSIS — Z12.11 ENCOUNTER FOR SCREENING COLONOSCOPY: ICD-10-CM

## 2024-06-26 DIAGNOSIS — E03.8 OTHER SPECIFIED HYPOTHYROIDISM: ICD-10-CM

## 2024-06-26 PROBLEM — K43.2 INCISIONAL HERNIA, WITHOUT OBSTRUCTION OR GANGRENE: Status: RESOLVED | Noted: 2023-03-23 | Resolved: 2024-06-26

## 2024-06-26 PROCEDURE — 1160F RVW MEDS BY RX/DR IN RCRD: CPT | Performed by: FAMILY MEDICINE

## 2024-06-26 PROCEDURE — 3074F SYST BP LT 130 MM HG: CPT | Performed by: FAMILY MEDICINE

## 2024-06-26 PROCEDURE — 99397 PER PM REEVAL EST PAT 65+ YR: CPT | Performed by: FAMILY MEDICINE

## 2024-06-26 PROCEDURE — G0439 PPPS, SUBSEQ VISIT: HCPCS | Performed by: FAMILY MEDICINE

## 2024-06-26 PROCEDURE — 1036F TOBACCO NON-USER: CPT | Performed by: FAMILY MEDICINE

## 2024-06-26 PROCEDURE — 99214 OFFICE O/P EST MOD 30 MIN: CPT | Performed by: FAMILY MEDICINE

## 2024-06-26 PROCEDURE — 73130 X-RAY EXAM OF HAND: CPT | Mod: 50

## 2024-06-26 PROCEDURE — 3078F DIAST BP <80 MM HG: CPT | Performed by: FAMILY MEDICINE

## 2024-06-26 PROCEDURE — 1159F MED LIST DOCD IN RCRD: CPT | Performed by: FAMILY MEDICINE

## 2024-06-26 PROCEDURE — 1123F ACP DISCUSS/DSCN MKR DOCD: CPT | Performed by: FAMILY MEDICINE

## 2024-06-26 PROCEDURE — 1170F FXNL STATUS ASSESSED: CPT | Performed by: FAMILY MEDICINE

## 2024-06-26 RX ORDER — DICLOFENAC SODIUM 75 MG/1
75 TABLET, DELAYED RELEASE ORAL 2 TIMES DAILY PRN
Qty: 60 TABLET | Refills: 5 | Status: SHIPPED | OUTPATIENT
Start: 2024-06-26 | End: 2024-12-23

## 2024-06-26 ASSESSMENT — ACTIVITIES OF DAILY LIVING (ADL)
TAKING_MEDICATION: INDEPENDENT
DOING_HOUSEWORK: INDEPENDENT
GROCERY_SHOPPING: INDEPENDENT
BATHING: INDEPENDENT
MANAGING_FINANCES: INDEPENDENT
DRESSING: INDEPENDENT

## 2024-06-26 ASSESSMENT — ENCOUNTER SYMPTOMS
FEVER: 0
CONFUSION: 0
ABDOMINAL PAIN: 0
CHILLS: 0
SHORTNESS OF BREATH: 0
PALPITATIONS: 0
ARTHRALGIAS: 1
CHEST TIGHTNESS: 0

## 2024-06-26 ASSESSMENT — PATIENT HEALTH QUESTIONNAIRE - PHQ9
SUM OF ALL RESPONSES TO PHQ9 QUESTIONS 1 AND 2: 0
2. FEELING DOWN, DEPRESSED OR HOPELESS: NOT AT ALL
1. LITTLE INTEREST OR PLEASURE IN DOING THINGS: NOT AT ALL

## 2024-06-26 NOTE — ASSESSMENT & PLAN NOTE
Previous x-rays on hands done several years ago reviewed with patient showing arthritis we will update those x-rays to check for progression.  Discontinue the meloxicam as it is not giving him as much relief now and try Voltaren 75 mg twice daily as needed with food.  Will also check rheumatologic test with next lab draw.  And we have referral to orthopedics.

## 2024-06-26 NOTE — ASSESSMENT & PLAN NOTE
Recent labs reviewed    Referral to get colonoscopy updated  PSA up-to-date    Pneumococcal vaccine series completed    Address additional vaccines in the fall.

## 2024-06-26 NOTE — PROGRESS NOTES
"Subjective   Reason for Visit: Sher Cameron is an 68 y.o. male here for a Medicare Wellness visit.     Past Medical, Surgical, and Family History reviewed and updated in chart.    Reviewed all medications by prescribing practitioner or clinical pharmacist (such as prescriptions, OTCs, herbal therapies and supplements) and documented in the medical record.    HPI patient today for follow-up of ongoing healthcare issues and review of recent lab work for most part states he is doing well except he is having issues with the arthritis in his hands and also has trigger finger movement of both ring fingers left is more bothersome than the right.  He says initially the meloxicam did offer him relief but does not seem to be working quite as well now.  Emotionally he feels things are stable on the Zoloft and he would like to continue.    Patient Care Team:  Zach Angela MD as PCP - General  Zach Angela MD as PCP - [facility] PCP  Zach Angela MD as PCP - [facility] PCP     Review of Systems   Constitutional:  Negative for chills and fever.   HENT:  Negative for congestion and ear pain.    Eyes:  Negative for visual disturbance.   Respiratory:  Negative for chest tightness and shortness of breath.    Cardiovascular:  Negative for chest pain and palpitations.   Gastrointestinal:  Negative for abdominal pain.   Musculoskeletal:  Positive for arthralgias.        Bilateral hand arthritis   Skin:  Negative for pallor.   Psychiatric/Behavioral:  Negative for confusion.        Objective   Vitals:  /69   Pulse 64   Temp 36.2 °C (97.2 °F)   Resp 14   Ht 1.765 m (5' 9.5\")   Wt 82.6 kg (182 lb)   SpO2 97%   BMI 26.49 kg/m²       Physical Exam  Vitals and nursing note reviewed.   Constitutional:       General: He is not in acute distress.     Appearance: Normal appearance. He is not ill-appearing.   HENT:      Head: Normocephalic and atraumatic.      Right Ear: Tympanic membrane, ear canal and " external ear normal.      Left Ear: Tympanic membrane, ear canal and external ear normal.      Mouth/Throat:      Pharynx: Oropharynx is clear.   Eyes:      Extraocular Movements: Extraocular movements intact.   Cardiovascular:      Rate and Rhythm: Normal rate and regular rhythm.      Pulses: Normal pulses.      Heart sounds: Normal heart sounds.   Pulmonary:      Effort: Pulmonary effort is normal.      Breath sounds: Normal breath sounds.   Abdominal:      General: Abdomen is flat. Bowel sounds are normal.      Palpations: Abdomen is soft.      Tenderness: There is no abdominal tenderness.   Musculoskeletal:         General: Tenderness present.      Cervical back: Neck supple.      Comments: Mild tenderness palpation of the hands bilaterally.  Reviewed with mild arthritic changes visibly present.  Slight trigger movement noted in ring fingers left more so than right.   Skin:     General: Skin is warm.   Neurological:      Mental Status: He is alert and oriented to person, place, and time. Mental status is at baseline.   Psychiatric:         Mood and Affect: Mood normal.       Recent Results (from the past 1008 hour(s))   TSH with reflex to Free T4 if abnormal    Collection Time: 06/25/24  9:55 AM   Result Value Ref Range    Thyroid Stimulating Hormone 1.66 0.44 - 3.98 mIU/L   Prostate Specific Antigen, Screen    Collection Time: 06/25/24  9:55 AM   Result Value Ref Range    Prostate Specific Antigen,Screen 0.45 <=4.00 ng/mL   Vitamin D 25-Hydroxy,Total (for eval of Vitamin D levels)    Collection Time: 06/25/24  9:55 AM   Result Value Ref Range    Vitamin D, 25-Hydroxy, Total 51 30 - 100 ng/mL   Lipid Panel    Collection Time: 06/25/24  9:55 AM   Result Value Ref Range    Cholesterol 130 0 - 199 mg/dL    HDL-Cholesterol 52.6 mg/dL    Cholesterol/HDL Ratio 2.5     LDL Calculated 64 <=99 mg/dL    VLDL 13 0 - 40 mg/dL    Triglycerides 66 0 - 149 mg/dL    Non HDL Cholesterol 77 0 - 149 mg/dL   Hemoglobin A1C     Collection Time: 06/25/24  9:55 AM   Result Value Ref Range    Hemoglobin A1C 5.8 (H) see below %    Estimated Average Glucose 120 Not Established mg/dL   Comprehensive Metabolic Panel    Collection Time: 06/25/24  9:55 AM   Result Value Ref Range    Glucose 89 74 - 99 mg/dL    Sodium 140 136 - 145 mmol/L    Potassium 4.4 3.5 - 5.3 mmol/L    Chloride 105 98 - 107 mmol/L    Bicarbonate 29 21 - 32 mmol/L    Anion Gap 10 10 - 20 mmol/L    Urea Nitrogen 19 6 - 23 mg/dL    Creatinine 0.90 0.50 - 1.30 mg/dL    eGFR >90 >60 mL/min/1.73m*2    Calcium 8.8 8.6 - 10.3 mg/dL    Albumin 4.0 3.4 - 5.0 g/dL    Alkaline Phosphatase 37 33 - 136 U/L    Total Protein 6.1 (L) 6.4 - 8.2 g/dL    AST 23 9 - 39 U/L    Bilirubin, Total 0.6 0.0 - 1.2 mg/dL    ALT 27 10 - 52 U/L   Cholesterol, LDL Direct    Collection Time: 06/25/24  9:55 AM   Result Value Ref Range    LDL, Direct 75 0 - 129 mg/dL   CBC    Collection Time: 06/25/24  9:55 AM   Result Value Ref Range    WBC 5.3 4.4 - 11.3 x10*3/uL    nRBC 0.0 0.0 - 0.0 /100 WBCs    RBC 5.36 4.50 - 5.90 x10*6/uL    Hemoglobin 14.4 13.5 - 17.5 g/dL    Hematocrit 44.4 41.0 - 52.0 %    MCV 83 80 - 100 fL    MCH 26.9 26.0 - 34.0 pg    MCHC 32.4 32.0 - 36.0 g/dL    RDW 13.7 11.5 - 14.5 %    Platelets 172 150 - 450 x10*3/uL     Recent labs reviewed with patient overall numbers are stable we will continue current medications and follow low-fat low-cholesterol diabetic diet    Check x-rays of both hands to update progression of arthritis  Referral to orthopedics regarding trigger fingers and any additional input they can give with regard to arthritis of the hands and comfort relief  Continue meloxicam and try Voltaren 75 mg twice daily as needed with food    Referral to his general surgeon update colonoscopy later this summer    Return to office 4 months with repeat fasting labs      Assessment/Plan   Problem List Items Addressed This Visit       Depression, major, single episode, moderate (Multi)     Current Assessment & Plan     Stable continue Zoloft 150 mg daily         Relevant Orders    Follow Up In Primary Care - Established    Ganglion cyst of finger    Current Assessment & Plan     Referral to orthopedic hand surgeon         Hand arthritis    Current Assessment & Plan     Previous x-rays on hands done several years ago reviewed with patient showing arthritis we will update those x-rays to check for progression.  Discontinue the meloxicam as it is not giving him as much relief now and try Voltaren 75 mg twice daily as needed with food.  Will also check rheumatologic test with next lab draw.  And we have referral to orthopedics.         Relevant Medications    diclofenac (Voltaren) 75 mg EC tablet    Other Relevant Orders    MADHURI with Reflex to JOE    C-Reactive Protein    Rheumatoid Factor    Sedimentation Rate    Referral to Orthopaedic Surgery    Follow Up In Primary Care - Established    XR hand right 3+ views    XR hand left 3+ views    Hyperlipidemia    Current Assessment & Plan     Stable continue atorvastatin 40 mg daily         Relevant Orders    Comprehensive Metabolic Panel    Lipid Panel    Follow Up In Primary Care - Established    Hypertension    Current Assessment & Plan     Stable continue current treatment         Relevant Orders    Comprehensive Metabolic Panel    Follow Up In Primary Care - Established    Hypothyroidism    Current Assessment & Plan     Stable continue levothyroxine 150 mcg daily         Relevant Orders    TSH with reflex to Free T4 if abnormal    Follow Up In Primary Care - Established    Prediabetes    Current Assessment & Plan     A1c 5.8% continue to work on dietary modifications         Relevant Orders    Comprehensive Metabolic Panel    Hemoglobin A1C    Follow Up In Primary Care - Established    Medicare annual wellness visit, subsequent - Primary    Current Assessment & Plan     Recent labs reviewed    Referral to get colonoscopy updated  PSA  up-to-date    Pneumococcal vaccine series completed    Address additional vaccines in the fall.           Annual physical exam    Current Assessment & Plan     Recent labs reviewed    Maintain low-fat low-cholesterol diabetic diet         Trigger ring finger of left hand    Current Assessment & Plan     Orthopedic referral         Relevant Orders    Referral to Orthopaedic Surgery    Trigger ring finger of right hand    Current Assessment & Plan     Orthopedic referral         Relevant Orders    Referral to Orthopaedic Surgery    Encounter for screening colonoscopy    Current Assessment & Plan     Referral back to his general surgeon update colonoscopy will be at the [facility]year [facility] in August 2024.         Relevant Orders    Referral to General Surgery

## 2024-06-26 NOTE — ASSESSMENT & PLAN NOTE
Referral back to his general surgeon update colonoscopy will be at the [facility]year [facility] in August 2024.

## 2024-07-17 ENCOUNTER — APPOINTMENT (OUTPATIENT)
Dept: ORTHOPEDIC SURGERY | Facility: CLINIC | Age: 69
End: 2024-07-17
Payer: COMMERCIAL

## 2024-07-17 VITALS — WEIGHT: 182 LBS | BODY MASS INDEX: 26.05 KG/M2 | HEIGHT: 70 IN

## 2024-07-17 DIAGNOSIS — M19.049 HAND ARTHRITIS: ICD-10-CM

## 2024-07-17 DIAGNOSIS — M65.342 TRIGGER RING FINGER OF LEFT HAND: ICD-10-CM

## 2024-07-17 DIAGNOSIS — M65.341 TRIGGER RING FINGER OF RIGHT HAND: ICD-10-CM

## 2024-07-17 DIAGNOSIS — M19.031 CMC DJD(CARPOMETACARPAL DEGENERATIVE JOINT DISEASE), LOCALIZED PRIMARY, RIGHT: Primary | ICD-10-CM

## 2024-07-17 DIAGNOSIS — M19.032 CMC DJD(CARPOMETACARPAL DEGENERATIVE JOINT DISEASE), LOCALIZED PRIMARY, LEFT: ICD-10-CM

## 2024-07-17 PROCEDURE — 20550 NJX 1 TENDON SHEATH/LIGAMENT: CPT | Performed by: ORTHOPAEDIC SURGERY

## 2024-07-17 PROCEDURE — 1160F RVW MEDS BY RX/DR IN RCRD: CPT | Performed by: ORTHOPAEDIC SURGERY

## 2024-07-17 PROCEDURE — 99204 OFFICE O/P NEW MOD 45 MIN: CPT | Performed by: ORTHOPAEDIC SURGERY

## 2024-07-17 PROCEDURE — 1159F MED LIST DOCD IN RCRD: CPT | Performed by: ORTHOPAEDIC SURGERY

## 2024-07-17 PROCEDURE — 1123F ACP DISCUSS/DSCN MKR DOCD: CPT | Performed by: ORTHOPAEDIC SURGERY

## 2024-07-17 PROCEDURE — 3008F BODY MASS INDEX DOCD: CPT | Performed by: ORTHOPAEDIC SURGERY

## 2024-07-17 PROCEDURE — 20600 DRAIN/INJ JOINT/BURSA W/O US: CPT | Performed by: ORTHOPAEDIC SURGERY

## 2024-07-17 PROCEDURE — 1036F TOBACCO NON-USER: CPT | Performed by: ORTHOPAEDIC SURGERY

## 2024-07-17 RX ORDER — LIDOCAINE HYDROCHLORIDE 10 MG/ML
1 INJECTION INFILTRATION; PERINEURAL
Status: COMPLETED | OUTPATIENT
Start: 2024-07-17 | End: 2024-07-17

## 2024-07-17 RX ORDER — TRIAMCINOLONE ACETONIDE 40 MG/ML
40 INJECTION, SUSPENSION INTRA-ARTICULAR; INTRAMUSCULAR
Status: COMPLETED | OUTPATIENT
Start: 2024-07-17 | End: 2024-07-17

## 2024-07-17 NOTE — PROGRESS NOTES
69 y.o. male presents today for evaluation of bilateral base of thumb pain and bilateral ring fingers catching, clicking, locking, pain. The patient reports symptoms for months in the fingers, years in the thumbs. Pain is controlled. Patient reports no numbness and tingling.  Reports no previous surgeries, injections, or trauma to the area.  Reports pain worse with use, better at rest.   Pain dull ache, sharp at times.  States fingers will lock at times, worse at night.  Thumbs worse when opening bottles and jars.  Does a lot of woodworking and painting and can cause him pain.  He states he has had a Comfort Cool in the past which has helped some for his right.  Also takes diclofenac which helps some.    Review of Systems    Constitutional: see HPI, no fever, no chills, not feeling tired, no significant weight gain or weight loss.   Eyes: No vision changes  ENT: no nosebleeds.   Cardiovascular: no chest pain.   Respiratory: no shortness of breath and no cough.   Gastrointestinal: no abdominal pain, no nausea, no vomiting and no diarrhea.   Musculoskeletal: per HPI  Neurological: no headache, no gait disturbances  Psychiatric: no depression and no sleep disturbances.   Endocrine: no muscle weakness and no muscle cramps.   Hematologic/Lymphatic: no swollen glands and no tendency for easy bruising or excessive swelling.     Patient's past medical history, past surgical history, allergies, and medications have been reviewed unless otherwise noted in the chart.     CMC Arthritis Exam  Inspection:  no evidence of infection, no edema, no erythema, no ecchymosis, Palpation:  compartments are soft, pain with palpation over the Thumb CMC joint, Range of Motion:  full wrist and finger range of motion, Stability:  no wrist or finger instability detected, Strength:  5/5  and pinch strength, Skin:  intact, Vascular:  capillary refill <2 seconds distally, Sensation:  intact to light touch distally, Tests:  negative  Finkelstein's , positive Thumb CMC Grind.      Trigger Exam  Digit: Bilateral ring fingers inspection:  no evidence of infection, no erythema, no edema, no ecchymosis, Palpation:  compartments are soft, TTP A1 pulley Range of Motion:  full composite finger flexion, Stability:  no finger instability, Strength:  5/5 strength with flexion and extension, Skin:  intact, Vascular:  capillary refill <2 seconds distally, Sensation:  sensation intact to light touch distally, Other:  no pain with palpation or motion proximally in the wrist.      Constitutional   General appearance: Alert and in no acute distress. Well developed, well nourished.    Eyes   External Eye, Conjunctiva and lids: Normal external exam - pupils were equal in size, round, reactive to light (PERRL) with normal accommodation and extraocular movements intact (EOMI).   Ears, Nose, Mouth, and Throat   Hearing: Normal.   Neck   Neck: No neck mass was observed. Supple.   Pulmonary   Respiratory effort: No respiratory distress.   Cardiovascular   Examination of extremities: No peripheral edema.   Psychiatric   Judgment and insight: Intact.   Orientation to person, place, and time: Alert and oriented x 3.       Mood and affect: Normal.      XR - no fractures, no dislocations, bilateral thumb CMC DJD, and index finger MCP DJD    X-rays were personally reviewed by me. Radiology reports were reviewed by me as well, if available at the time.      Bilateral thumb CMC DJD  Based on the history, physical exam and imaging studies above, the patient's presentation is consistent with the above diagnosis.  I had a long discussion with the patient regarding their presentation and the treatment options.  We discussed initial nonoperative versus operative management options as well as potential further diagnostic imaging.  We again discussed her treatment options going forward along with their associated risks and benefits. After thorough discussion, the patient has elected  to proceed with conservative management. All questions were answered to the patients satisfaction who seems satisfied with the plan.  They will call the office with any questions/concerns.    Discussion I discussed the diagnosis and treatment options with the patient today along with their associated risks and benefits. After thorough discussion, the patient has elected to proceed with a corticosteroid injection with Kenalog and Xylocaine, which was performed in the office today under aseptic technique and the patient tolerated the procedure well.          Ortho Injections:           Injection site bilateral thumb CMC. Medication 1 cc 1% Xylocaine, 1 cc 40 mg Kenalog, Injection given under sterile conditions. No immediate complications noted. Post injection instructions given.  Comfort Cool as needed  Continue diclofenac  Follow-up 16 weeks as needed no x-ray    Bilateral ring finger triggers  Based on the history, physical exam and imaging studies above, the patient's presentation is consistent with the above diagnosis.  I had a long discussion with the patient regarding their presentation and the treatment options.  We discussed initial nonoperative versus operative management options as well as potential further diagnostic imaging.  We again discussed her treatment options going forward along with their associated risks and benefits. After thorough discussion, the patient has elected to proceed with conservative management. All questions were answered to the patients satisfaction who seems satisfied with the plan.  They will call the office with any questions/concerns.    Discussion I discussed the diagnosis and treatment options with the patient today along with their associated risks and benefits. After thorough discussion, the patient has elected to proceed with a corticosteroid injection with Kenalog and Xylocaine, which was performed in the office today under aseptic technique and the patient tolerated the  procedure well.          Ortho Injections:           Injection site bilateral ring finger A1 pulleys. Medication 1 cc 1% Xylocaine, 1 cc 10 mg Kenalog, Injection given under sterile conditions. No immediate complications noted. Post injection instructions given.  Follow-up 8 weeks as needed no x-ray    Patient ID: Sher Cameron is a 69 y.o. male.    Hand / UE Inj/Asp: bilateral ring A1 for trigger finger on 7/17/2024 9:24 AM  Indications: therapeutic  Details: 25 G needle, volar approach  Medications (Right): 10 mg triamcinolone acetonide 10 mg/mL; 1 mL lidocaine 10 mg/mL (1 %)  Medications (Left): 10 mg triamcinolone acetonide 10 mg/mL; 1 mL lidocaine 10 mg/mL (1 %)  Outcome: tolerated well, no immediate complications  Procedure, treatment alternatives, risks and benefits explained, specific risks discussed. Consent was given by the patient. Immediately prior to procedure a time out was called to verify the correct patient, procedure, equipment, support staff and site/side marked as required. Patient was prepped and draped in the usual sterile fashion.       S Inj/Asp: bilateral thumb CMC on 7/17/2024 9:24 AM  Indications: pain  Details: 25 G needle (dorsoradial) approach  Medications (Right): 40 mg triamcinolone acetonide 40 mg/mL; 1 mL lidocaine 10 mg/mL (1 %)  Medications (Left): 40 mg triamcinolone acetonide 40 mg/mL; 1 mL lidocaine 10 mg/mL (1 %)  Outcome: tolerated well, no immediate complications  Procedure, treatment alternatives, risks and benefits explained, specific risks discussed. Consent was given by the patient. Immediately prior to procedure a time out was called to verify the correct patient, procedure, equipment, support staff and site/side marked as required. Patient was prepped and draped in the usual sterile fashion.

## 2024-07-18 ENCOUNTER — TELEPHONE (OUTPATIENT)
Dept: PRIMARY CARE | Facility: CLINIC | Age: 69
End: 2024-07-18
Payer: COMMERCIAL

## 2024-07-18 DIAGNOSIS — E78.49 OTHER HYPERLIPIDEMIA: ICD-10-CM

## 2024-07-18 RX ORDER — ATORVASTATIN CALCIUM 40 MG/1
40 TABLET, FILM COATED ORAL NIGHTLY
Qty: 90 TABLET | Refills: 3 | Status: SHIPPED | OUTPATIENT
Start: 2024-07-18

## 2024-07-18 NOTE — TELEPHONE ENCOUNTER
Rx Refill Request Telephone Encounter    Name:  Sher Cameron  :  [id number]  Medication Name:        atorvastatin (Lipitor) 40 mg tablet [[id number]]  1 tab taken once daily at bedtime                    Specific Pharmacy location:  [facility] in [place]  Date of last appointment:  2024  Date of next appointment:  10/31/2024  Best number to reach patient:  [phone]

## 2024-09-26 DIAGNOSIS — M25.562 CHRONIC PAIN OF LEFT KNEE: ICD-10-CM

## 2024-09-26 DIAGNOSIS — G89.29 CHRONIC PAIN OF LEFT KNEE: ICD-10-CM

## 2024-10-02 ENCOUNTER — OFFICE VISIT (OUTPATIENT)
Dept: ORTHOPEDIC SURGERY | Facility: HOSPITAL | Age: 69
End: 2024-10-02
Payer: COMMERCIAL

## 2024-10-02 ENCOUNTER — HOSPITAL ENCOUNTER (OUTPATIENT)
Dept: RADIOLOGY | Facility: HOSPITAL | Age: 69
Discharge: HOME | End: 2024-10-02
Payer: COMMERCIAL

## 2024-10-02 VITALS — BODY MASS INDEX: 26.05 KG/M2 | HEIGHT: 70 IN | WEIGHT: 182 LBS

## 2024-10-02 DIAGNOSIS — M25.562 CHRONIC PAIN OF LEFT KNEE: ICD-10-CM

## 2024-10-02 DIAGNOSIS — M17.12 PRIMARY LOCALIZED OSTEOARTHRITIS OF LEFT KNEE: ICD-10-CM

## 2024-10-02 DIAGNOSIS — M17.12 PRIMARY OSTEOARTHRITIS OF LEFT KNEE: Primary | ICD-10-CM

## 2024-10-02 DIAGNOSIS — G89.29 CHRONIC PAIN OF LEFT KNEE: ICD-10-CM

## 2024-10-02 PROCEDURE — 20610 DRAIN/INJ JOINT/BURSA W/O US: CPT | Mod: LT | Performed by: SPECIALIST

## 2024-10-02 PROCEDURE — 99214 OFFICE O/P EST MOD 30 MIN: CPT | Performed by: SPECIALIST

## 2024-10-02 PROCEDURE — 2500000004 HC RX 250 GENERAL PHARMACY W/ HCPCS (ALT 636 FOR OP/ED): Performed by: SPECIALIST

## 2024-10-02 PROCEDURE — 73562 X-RAY EXAM OF KNEE 3: CPT | Mod: LT

## 2024-10-02 NOTE — PROGRESS NOTES
NPV Left Knee Pain Xr done today   Patient states no known injury pain for about a year difficulty bending and straightening the knee pain moves downward no specific treatment here for assessment of knee and x-rays.    HPI as noted above  EXAM:    GENERAL: A/Ox3, NAD. Appears healthy, well nourished  SKIN: no erythema, rashes, or ecchymoses     MUSCULOSKELETAL:  Laterality: Left knee Exam  - Alignment: partially correctible varus deformity  - ROM: Full with mild crepitus and pain  - Effusion: none  - Strength: knee extension and flexion 5/5, EHL/PF/DF motor intact  - Palpation: TTP mostly along medial joint line  - Stability: Anterior/Posterior stable, varus/valgus stable. Mild pseudo valgus instability  - Gait: mild antalgic  - Hip Exam: flexion to 100+ degrees, full extension, internal/external rotation adequate, and no pain with log roll  - Special Tests: none performed    NEUROVASCULAR:  - Neurovascular Status: sensation intact to light touch distally  - Capillary refill brisk at extremities, Bilateral dorsalis pedis pulse 2+    RADIOGRAPHS osteoarthritic change with joint space narrowing most noted at medial and patellofemoral compartment    ASSESSMENT osteoarthritis left knee    PLAN patient tolerated a Kenalog injection well today and will start a physical therapy program.  Follow-up if no improvement.  Would be an excellent candidate for hyaluronic acid injection in the future.  L Inj/Asp: L knee on 10/2/2024 6:54 AM  Indications: pain  Details: 22 G needle, anterolateral approach  Medications: 1 mL lidocaine 10 mg/mL (1 %); 40 mg triamcinolone acetonide 40 mg/mL  Outcome: tolerated well, no immediate complications  Procedure, treatment alternatives, risks and benefits explained, specific risks discussed. Consent was given by the patient. Immediately prior to procedure a time out was called to verify the correct patient, procedure, equipment, support staff and site/side marked as required. Patient was  prepped and draped in the usual sterile fashion.           This note was dictated using speech recognition software and was not corrected for spelling or grammatical errors

## 2024-10-03 RX ORDER — LIDOCAINE HYDROCHLORIDE 10 MG/ML
1 INJECTION, SOLUTION INFILTRATION; PERINEURAL
Status: COMPLETED | OUTPATIENT
Start: 2024-10-02 | End: 2024-10-02

## 2024-10-03 RX ORDER — TRIAMCINOLONE ACETONIDE 40 MG/ML
40 INJECTION, SUSPENSION INTRA-ARTICULAR; INTRAMUSCULAR
Status: COMPLETED | OUTPATIENT
Start: 2024-10-02 | End: 2024-10-02

## 2024-10-10 ENCOUNTER — TELEPHONE (OUTPATIENT)
Dept: PRIMARY CARE | Facility: CLINIC | Age: 69
End: 2024-10-10
Payer: COMMERCIAL

## 2024-10-10 DIAGNOSIS — Z12.11 SCREENING FOR COLON CANCER: Primary | ICD-10-CM

## 2024-10-10 NOTE — TELEPHONE ENCOUNTER
Patient called in about his Colonoscopy. The patient would like a Cologuard instead of the traditional Colonoscopy if possible.

## 2024-10-23 ENCOUNTER — EVALUATION (OUTPATIENT)
Dept: PHYSICAL THERAPY | Facility: HOSPITAL | Age: 69
End: 2024-10-23
Payer: COMMERCIAL

## 2024-10-23 DIAGNOSIS — M17.12 PRIMARY LOCALIZED OSTEOARTHRITIS OF LEFT KNEE: Primary | ICD-10-CM

## 2024-10-23 DIAGNOSIS — M25.662 IMPAIRED RANGE OF MOTION OF LEFT KNEE: ICD-10-CM

## 2024-10-23 PROCEDURE — 97161 PT EVAL LOW COMPLEX 20 MIN: CPT | Mod: GP

## 2024-10-23 PROCEDURE — 97110 THERAPEUTIC EXERCISES: CPT | Mod: GP

## 2024-10-23 ASSESSMENT — PAIN DESCRIPTION - DESCRIPTORS: DESCRIPTORS: ACHING;DULL

## 2024-10-23 ASSESSMENT — PAIN - FUNCTIONAL ASSESSMENT: PAIN_FUNCTIONAL_ASSESSMENT: 0-10

## 2024-10-23 ASSESSMENT — ENCOUNTER SYMPTOMS
OCCASIONAL FEELINGS OF UNSTEADINESS: 0
DEPRESSION: 0
LOSS OF SENSATION IN FEET: 1

## 2024-10-23 ASSESSMENT — PAIN SCALES - GENERAL: PAINLEVEL_OUTOF10: 5 - MODERATE PAIN

## 2024-10-23 NOTE — PROGRESS NOTES
Physical Therapy    Physical Therapy Evaluation    Patient Name: Sher Cameron  MRN: [id number]  : 1955  Referring Physician: Joshua Byrne  Today's Date: 10/23/2024  Time Calculation  Start Time: 1306  Stop Time: 1345  Time Calculation (min): 39 min  PT Evaluation Time Entry  PT Evaluation (Low) Time Entry: 30  PT Therapeutic Procedures Time Entry  Therapeutic Exercise Time Entry: 8  Auth Visit Dates: TBD  Visit #1    Current Problem  Problem List Items Addressed This Visit             ICD-10-CM    Primary localized osteoarthritis of left knee - Primary M17.12    Relevant Orders    Follow Up In Physical Therapy    Impaired range of motion of left knee M25.662    Relevant Orders    Follow Up In Physical Therapy          SUBJECTIVE  Subjective   Pt's name and  were confirmed this date. Pt is a 69 year old M reporting to initial physical therapy evaluation for L knee pain that began increasing in pain intensity and impairing mobility over the last 2 years. No known injury or trauma to L knee. L foot N/T intermittently after sitting for a long time.  Pt did have Kenalog injection on 10/2 that pt notes has been helpful in reducing pain and improving mobility. X-rays performed with Dr. Byrne noting OA of L knee. Prior to injection, pt states he had palpable and audible crepitus and feeling of instability/locking in L knee. This is leading to difficulty with negotiating steps, getting into and out of car, and ambulating for long distances with increased pain following.  Patient states that their goal is to reduce pain and improve mobility with physical therapy intervention. 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     Precautions  Precautions  STEADI Fall Risk Score (The score of 4 or more indicates an increased risk of falling): 4  Medical Precautions:  (HTN, OA, RA, Thyroid Disorder)       Pain  Pain Assessment: 0-10  0-10 (Numeric) Pain Score: 5 - Moderate  "pain  Pain Type: Chronic pain  Pain Location: Knee  Pain Orientation: Left  Pain Radiating Towards: N/T into L foot on ball of foot  Pain Descriptors: Aching, Dull (\"Full\", \"Stiff\")  Pain Frequency: Constant/continuous    OBJECTIVE:  Lower Extremity Strength:  LE strength not listed below is WNL  MMT 5/5 max  LEFT RIGHT   Hip Flexion 4 4   Hip Extension 5 5   Hip Abduction 4 with pain in medial aspect of knee cap 5    Hip Adduction 5 5   Knee Extension 4 with audible and palpable crepitus 5   Knee Flexion 4 with audible and palpable crepitus 5   Ankle DF 5 5   Ankle PF 5 5     KNEE AROM:    LEFT RIGHT   Knee Flexion 140 degrees 120 degrees with pain at end range    Knee Extension 0 0     Joint mobility Nabil patella stiffness noted with Grade 1-4 medial<>lateral, inferior<>superior mobs    Gait mechanics: Minimal L antalgic gait. Reduced L knee flexion during swing phase.     Palpation Palpable edema in L knee (lateral inferior aspect of patella > medial inferior aspect of patella)    Single Leg Raise: 1x5 nabil. Pt reporting tightness in LLE compared to RLE. Pt notes \"Easier\" on RLE compared to LLE.     Sit to Stand: pushes with bilI UEs on mat/chair.     Outcome Measure:  Other Measures  Lower Extremity Funtional Score (LEFS): 54/80     TREATMENT: HEP performed in session and issued for home. See below.   EXERCISES       Date              VISIT # # # # #    REPS REPS REPS REPS          Nustep       Bike              Shuttle  DLP       Shuttle SLP       Shuttle TR/HR              Qhip Flexion       Qhip Abduction       Qhip Extension       Qhip  TKE              SLR with Quad Set       Squat to chair              RB stretch       HS stretch              PROM       Towel Slides       Manual               HEP         HEP  Access Code: [email or web address]  URL: [email or web address]  Date: 10/23/2024  Prepared by: Gianna Neely  Exercises  - Active Straight Leg Raise with Quad Set  - 1 x daily - 7 x weekly - 3 " sets - 10 reps  - Supine Heel Slide with Strap  - 1 x daily - 7 x weekly - 3 sets - 10 reps  - Seated Inferior Patellar Glide  - 1 x daily - 7 x weekly - 3 sets - 10 reps  - Seated Superior Patellar Glide  - 1 x daily - 7 x weekly - 3 sets - 10 reps  - Seated Medial Patellar Glide  - 1 x daily - 7 x weekly - 3 sets - 10 reps  - Sitting Lateral Patellar Glide  - 1 x daily - 7 x weekly - 3 sets - 10 reps    ASSESSEMENT  PT Assessment  PT Assessment Results: Decreased strength, Decreased range of motion, Decreased endurance, Decreased mobility, Pain  Rehab Prognosis: Fair  Evaluation/Treatment Tolerance: Patient tolerated treatment well  Strengths: Ability to acquire knowledge, Attitude of self, Capable of completing ADLs semi/independent, Insight into problems  Pt would benefit from skilled physical therapy to address the deficits listed above in order to maintain and improve LLE strength and ROM for functional mobility with reduced pain levels.     EDUCATION  Outpatient Education  Individual(s) Educated: Patient  Education Provided: Anatomy, Body Mechanics, Home Exercise Program, POC  Risk and Benefits Discussed with Patient/Caregiver/Other: yes  Patient/Caregiver Demonstrated Understanding: yes  Plan of Care Discussed and Agreed Upon: yes  Patient Response to Education: Patient/Caregiver Verbalized Understanding of Information, Patient/Caregiver Performed Return Demonstration of Exercises/Activities, Patient/Caregiver Asked Appropriate Questions  Education Comment: Educated pt on role of PT and progression through POC. Educated pt on benefits of aquatic PT with pt reporting preference for land based PT. Educated pt on physiology of OA and goals to maintain and progress with reduced pain at this time. Pt educated through HEP with purpose for each intervention with pt demonstrating and reporting good understanding at this time.    PLAN  Treatment/Interventions: Education/ Instruction, Gait training, Manual therapy,  Neuromuscular re-education, Taping techniques, Therapeutic activities, Therapeutic exercises  PT Plan: Skilled PT  PT Frequency: 2 times per week  Duration: 6-8 weeks (from IE 10/23/24)  Onset Date: 10/23/24  Number of Treatments Authorized: TBD  Rehab Potential: Fair  Plan of Care Agreement: Patient    Goals:  Active       PT Problem       Patient will maintain and progress L knee ROM  knee flexion ROM from 120 degrees for ambulation and stair/curb negotiation.        Start:  10/24/24    Expected End:  01/23/25            Patient will achieve left knee flexion and extension strength of 5/5 evidenced by MMT scores for equal strength bilaterally for gait mechanics and functional mobility.        Start:  10/24/24    Expected End:  01/23/25            Patient will report pain of less than or equal to 2/10 over 2 consecutive treatment sessions demonstrating a reduction of overall pain       Start:  10/24/24    Expected End:  01/23/25            Patient will show a significant change in LEFS patient reported outcome tool to demonstrate subjective improvement       Start:  10/24/24    Expected End:  01/23/25               PT Problem       Patient will demonstrate independence in home program for support of progression       Start:  10/24/24    Expected End:  01/23/25

## 2024-10-30 ENCOUNTER — LAB (OUTPATIENT)
Dept: LAB | Facility: LAB | Age: 69
End: 2024-10-30
Payer: COMMERCIAL

## 2024-10-30 DIAGNOSIS — E03.8 OTHER SPECIFIED HYPOTHYROIDISM: ICD-10-CM

## 2024-10-30 DIAGNOSIS — I10 PRIMARY HYPERTENSION: ICD-10-CM

## 2024-10-30 DIAGNOSIS — E78.49 OTHER HYPERLIPIDEMIA: ICD-10-CM

## 2024-10-30 DIAGNOSIS — R73.03 PREDIABETES: ICD-10-CM

## 2024-10-30 DIAGNOSIS — M19.049 HAND ARTHRITIS: ICD-10-CM

## 2024-10-30 LAB
ALBUMIN SERPL BCP-MCNC: 4 G/DL (ref 3.4–5)
ALP SERPL-CCNC: 38 U/L (ref 33–136)
ALT SERPL W P-5'-P-CCNC: 26 U/L (ref 10–52)
ANION GAP SERPL CALC-SCNC: 10 MMOL/L (ref 10–20)
AST SERPL W P-5'-P-CCNC: 21 U/L (ref 9–39)
BILIRUB SERPL-MCNC: 0.6 MG/DL (ref 0–1.2)
BUN SERPL-MCNC: 18 MG/DL (ref 6–23)
CALCIUM SERPL-MCNC: 8.8 MG/DL (ref 8.6–10.3)
CHLORIDE SERPL-SCNC: 104 MMOL/L (ref 98–107)
CHOLEST SERPL-MCNC: 150 MG/DL (ref 0–199)
CHOLESTEROL/HDL RATIO: 2.9
CO2 SERPL-SCNC: 29 MMOL/L (ref 21–32)
CREAT SERPL-MCNC: 0.92 MG/DL (ref 0.5–1.3)
CRP SERPL-MCNC: 0.19 MG/DL
EGFRCR SERPLBLD CKD-EPI 2021: 89 ML/MIN/1.73M*2
ERYTHROCYTE [SEDIMENTATION RATE] IN BLOOD BY WESTERGREN METHOD: 7 MM/H (ref 0–20)
EST. AVERAGE GLUCOSE BLD GHB EST-MCNC: 120 MG/DL
GLUCOSE SERPL-MCNC: 94 MG/DL (ref 74–99)
HBA1C MFR BLD: 5.8 %
HDLC SERPL-MCNC: 51.9 MG/DL
LDLC SERPL CALC-MCNC: 82 MG/DL
NON HDL CHOLESTEROL: 98 MG/DL (ref 0–149)
POTASSIUM SERPL-SCNC: 4 MMOL/L (ref 3.5–5.3)
PROT SERPL-MCNC: 6.3 G/DL (ref 6.4–8.2)
RHEUMATOID FACT SER NEPH-ACNC: <10 IU/ML (ref 0–15)
SODIUM SERPL-SCNC: 139 MMOL/L (ref 136–145)
TRIGL SERPL-MCNC: 80 MG/DL (ref 0–149)
TSH SERPL-ACNC: 2.33 MIU/L (ref 0.44–3.98)
VLDL: 16 MG/DL (ref 0–40)

## 2024-10-30 PROCEDURE — 86431 RHEUMATOID FACTOR QUANT: CPT

## 2024-10-30 PROCEDURE — 86038 ANTINUCLEAR ANTIBODIES: CPT

## 2024-10-30 PROCEDURE — 83036 HEMOGLOBIN GLYCOSYLATED A1C: CPT

## 2024-10-30 PROCEDURE — 85652 RBC SED RATE AUTOMATED: CPT

## 2024-10-30 PROCEDURE — 80053 COMPREHEN METABOLIC PANEL: CPT

## 2024-10-30 PROCEDURE — 80061 LIPID PANEL: CPT

## 2024-10-30 PROCEDURE — 86140 C-REACTIVE PROTEIN: CPT

## 2024-10-30 PROCEDURE — 36415 COLL VENOUS BLD VENIPUNCTURE: CPT

## 2024-10-30 PROCEDURE — 84443 ASSAY THYROID STIM HORMONE: CPT

## 2024-10-31 ENCOUNTER — APPOINTMENT (OUTPATIENT)
Dept: PRIMARY CARE | Facility: CLINIC | Age: 69
End: 2024-10-31
Payer: COMMERCIAL

## 2024-10-31 VITALS
RESPIRATION RATE: 16 BRPM | TEMPERATURE: 97.7 F | HEART RATE: 70 BPM | BODY MASS INDEX: 25.8 KG/M2 | OXYGEN SATURATION: 96 % | SYSTOLIC BLOOD PRESSURE: 137 MMHG | WEIGHT: 179.8 LBS | DIASTOLIC BLOOD PRESSURE: 79 MMHG

## 2024-10-31 DIAGNOSIS — R73.03 PREDIABETES: ICD-10-CM

## 2024-10-31 DIAGNOSIS — F32.1 DEPRESSION, MAJOR, SINGLE EPISODE, MODERATE (MULTI): Primary | ICD-10-CM

## 2024-10-31 DIAGNOSIS — M19.049 HAND ARTHRITIS: ICD-10-CM

## 2024-10-31 DIAGNOSIS — E78.49 OTHER HYPERLIPIDEMIA: ICD-10-CM

## 2024-10-31 DIAGNOSIS — E03.8 OTHER SPECIFIED HYPOTHYROIDISM: ICD-10-CM

## 2024-10-31 DIAGNOSIS — Z23 ENCOUNTER FOR IMMUNIZATION: ICD-10-CM

## 2024-10-31 DIAGNOSIS — I10 PRIMARY HYPERTENSION: ICD-10-CM

## 2024-10-31 LAB — ANA SER QL HEP2 SUBST: NEGATIVE

## 2024-10-31 PROCEDURE — 90662 IIV NO PRSV INCREASED AG IM: CPT | Performed by: FAMILY MEDICINE

## 2024-10-31 PROCEDURE — G0008 ADMIN INFLUENZA VIRUS VAC: HCPCS | Performed by: FAMILY MEDICINE

## 2024-10-31 PROCEDURE — 3075F SYST BP GE 130 - 139MM HG: CPT | Performed by: FAMILY MEDICINE

## 2024-10-31 PROCEDURE — 1160F RVW MEDS BY RX/DR IN RCRD: CPT | Performed by: FAMILY MEDICINE

## 2024-10-31 PROCEDURE — 99214 OFFICE O/P EST MOD 30 MIN: CPT | Performed by: FAMILY MEDICINE

## 2024-10-31 PROCEDURE — 1036F TOBACCO NON-USER: CPT | Performed by: FAMILY MEDICINE

## 2024-10-31 PROCEDURE — G2211 COMPLEX E/M VISIT ADD ON: HCPCS | Performed by: FAMILY MEDICINE

## 2024-10-31 PROCEDURE — 3078F DIAST BP <80 MM HG: CPT | Performed by: FAMILY MEDICINE

## 2024-10-31 PROCEDURE — 1159F MED LIST DOCD IN RCRD: CPT | Performed by: FAMILY MEDICINE

## 2024-10-31 PROCEDURE — 1123F ACP DISCUSS/DSCN MKR DOCD: CPT | Performed by: FAMILY MEDICINE

## 2024-10-31 RX ORDER — LEVOTHYROXINE SODIUM 150 UG/1
150 TABLET ORAL DAILY
Qty: 90 TABLET | Refills: 3 | Status: CANCELLED | OUTPATIENT
Start: 2024-10-31 | End: 2025-10-26

## 2024-10-31 ASSESSMENT — ENCOUNTER SYMPTOMS
PALPITATIONS: 0
CHILLS: 0
ABDOMINAL PAIN: 0
CONFUSION: 0
ARTHRALGIAS: 1
SHORTNESS OF BREATH: 0
CHEST TIGHTNESS: 0
FEVER: 0

## 2024-11-06 ENCOUNTER — TREATMENT (OUTPATIENT)
Dept: PHYSICAL THERAPY | Facility: HOSPITAL | Age: 69
End: 2024-11-06
Payer: COMMERCIAL

## 2024-11-06 DIAGNOSIS — M25.662 IMPAIRED RANGE OF MOTION OF LEFT KNEE: ICD-10-CM

## 2024-11-06 DIAGNOSIS — M17.12 PRIMARY LOCALIZED OSTEOARTHRITIS OF LEFT KNEE: ICD-10-CM

## 2024-11-06 LAB — NONINV COLON CA DNA+OCC BLD SCRN STL QL: POSITIVE

## 2024-11-06 PROCEDURE — 97110 THERAPEUTIC EXERCISES: CPT | Mod: GP,CQ

## 2024-11-06 PROCEDURE — 97140 MANUAL THERAPY 1/> REGIONS: CPT | Mod: GP,CQ

## 2024-11-06 ASSESSMENT — PAIN - FUNCTIONAL ASSESSMENT: PAIN_FUNCTIONAL_ASSESSMENT: 0-10

## 2024-11-06 ASSESSMENT — PAIN SCALES - GENERAL: PAINLEVEL_OUTOF10: 2

## 2024-11-06 ASSESSMENT — PAIN DESCRIPTION - DESCRIPTORS: DESCRIPTORS: ACHING

## 2024-11-06 NOTE — PROGRESS NOTES
Physical Therapy    Physical Therapy Treatment    Patient Name: Sher Cameron  MRN: [id number]  : 1955  Today's Date: 2024  Time Calculation  Start Time: 1515  Stop Time: 1600  Time Calculation (min): 45 min    PT Therapeutic Procedures Time Entry  Manual Therapy Time Entry: 10  Therapeutic Exercise Time Entry: 35          VISIT:# 2    Current Problem  Problem List Items Addressed This Visit             ICD-10-CM       Musculoskeletal and Injuries    Primary localized osteoarthritis of left knee M17.12    Impaired range of motion of left knee M25.662        Subjective   Name and  confirmed at the beginning of the session. Pt reports pain in L knee 2/10. He states that pain is dependent on what he is doing.     Precautions  Precautions  STEADI Fall Risk Score (The score of 4 or more indicates an increased risk of falling): 4  Medical Precautions:  (HTN, OA, RA, Thyroid Disorder)       Pain  Pain Assessment: 0-10  0-10 (Numeric) Pain Score: 2  Pain Type: Chronic pain  Pain Location: Knee  Pain Orientation: Left  Pain Descriptors: Aching  Pain Frequency: Intermittent       Objective            Treatments:  EXERCISES       Date 2024              VISIT # #2 # # #    REPS REPS REPS REPS          Nustep L5 10 min      Bike              Shuttle  DLP 6B 2x10      Shuttle SLP 5B 2x10 Nabil      Shuttle TR/HR 4B 2x10             Qhip Flexion 15# 2x10 Nabil      Qhip Abduction 15# 2x10 Nbail      Qhip Extension 15# 2x10 Nabil      Qhip  TKE              SLR with Quad Set 2x10 L      Quad Sets 2x10 L      Squat to chair              RB stretch       HS stretch              PROM       Towel Slides       Manual  10 min             HEP Reviewed w/ Pt          Assessment:  Pt reported pain in L knee 0/10 at the end of the session. Pt did present with a slight antalgic gait on the L side. He tolerated treatment well with no complaints of pain. Pt responded well to manual therapy/PROM. Continue to focus sessions on  improving LE strength, decreasing pain, and maintaining ROM for proper gait mechanics.      Plan:  OP PT Plan  Treatment/Interventions: Education/ Instruction, Gait training, Manual therapy, Neuromuscular re-education, Taping techniques, Therapeutic activities, Therapeutic exercises  PT Plan: Skilled PT  PT Frequency: 2 times per week  Duration: 6-8 weeks (from IE 10/23/24)  Onset Date: 10/23/24  Number of Treatments Authorized: TBD  Rehab Potential: Fair  Plan of Care Agreement: Patient    Goals:  Active       PT Problem       Patient will maintain and progress L knee ROM  knee flexion ROM from 120 degrees for ambulation and stair/curb negotiation.        Start:  10/24/24    Expected End:  01/23/25            Patient will achieve left knee flexion and extension strength of 5/5 evidenced by MMT scores for equal strength bilaterally for gait mechanics and functional mobility.        Start:  10/24/24    Expected End:  01/23/25            Patient will report pain of less than or equal to 2/10 over 2 consecutive treatment sessions demonstrating a reduction of overall pain       Start:  10/24/24    Expected End:  01/23/25            Patient will show a significant change in LEFS patient reported outcome tool to demonstrate subjective improvement       Start:  10/24/24    Expected End:  01/23/25               PT Problem       Patient will demonstrate independence in home program for support of progression       Start:  10/24/24    Expected End:  01/23/25

## 2024-11-07 DIAGNOSIS — R19.5 POSITIVE COLORECTAL CANCER SCREENING USING COLOGUARD TEST: Primary | ICD-10-CM

## 2024-11-07 DIAGNOSIS — Z12.11 ENCOUNTER FOR SCREENING COLONOSCOPY: ICD-10-CM

## 2024-11-13 ENCOUNTER — TREATMENT (OUTPATIENT)
Dept: PHYSICAL THERAPY | Facility: HOSPITAL | Age: 69
End: 2024-11-13
Payer: COMMERCIAL

## 2024-11-13 DIAGNOSIS — M17.12 PRIMARY LOCALIZED OSTEOARTHRITIS OF LEFT KNEE: Primary | ICD-10-CM

## 2024-11-13 DIAGNOSIS — M25.662 IMPAIRED RANGE OF MOTION OF LEFT KNEE: ICD-10-CM

## 2024-11-13 PROCEDURE — 97110 THERAPEUTIC EXERCISES: CPT | Mod: GP

## 2024-11-13 ASSESSMENT — PAIN DESCRIPTION - DESCRIPTORS: DESCRIPTORS: ACHING

## 2024-11-13 ASSESSMENT — PAIN - FUNCTIONAL ASSESSMENT: PAIN_FUNCTIONAL_ASSESSMENT: 0-10

## 2024-11-13 ASSESSMENT — PAIN SCALES - GENERAL: PAINLEVEL_OUTOF10: 5 - MODERATE PAIN

## 2024-11-13 NOTE — PROGRESS NOTES
"Physical Therapy    Physical Therapy Treatment    Patient Name: Sher Cameron  MRN: [id number]  Today's Date: 11/13/2024  Time Calculation  Start Time: 1523  Stop Time: 1600  Time Calculation (min): 37 min  PT Therapeutic Procedures Time Entry  Therapeutic Exercise Time Entry: 37  Auth Visit Dates: 10/23/24-10/23/25  VISIT# 3    Current Problem  Problem List Items Addressed This Visit             ICD-10-CM    Primary localized osteoarthritis of left knee - Primary M17.12    Impaired range of motion of left knee M25.662       Subjective   Pt reports no recent falls. Pt with increased pain this date with pt contributing pain to weather yesterday. Pt notes continued HEP compliance every night. Pt states knee feels a bit stiff and he continues to have swelling but denies icing.      Precautions  Precautions  STEADI Fall Risk Score (The score of 4 or more indicates an increased risk of falling): 4  Medical Precautions:  (HTN, OA, RA, Thyroid Disorder)       Pain  Pain Assessment: 0-10  0-10 (Numeric) Pain Score: 5 - Moderate pain  Pain Type: Chronic pain  Pain Location: Knee  Pain Orientation: Left  Pain Descriptors: Aching  Pain Frequency: Intermittent       Objective   Pt 8 min late to session.   Initiated lateral step ups, RB stretch, qhip TKEs, squat to chair, cart push/pulls    Treatments:  EXERCISES       Date 11/6/2024 11/13/24            VISIT # #2 #3 # #    REPS REPS REPS REPS          Nustep L5 10 min L5 10 min      Bike              Shuttle  DLP 6B 2x10      Shuttle SLP 5B 2x10 Nabil      Shuttle TR/HR 4B 2x10             Qhip Flexion 15# 2x10 Nabil      Qhip Abduction 15# 2x10 Anbil 15# 2x10 nabil      Qhip Extension 15# 2x10 Nabil 15# 2x10 nabil     Qhip  TKE  15# 1x10 nabil 3\" H            SLR with Quad Set 2x10 L HEP     Quad Sets 2x10 L      Squat to chair  1x10      Cart push/pulls   15# 155 feet ea            RB stretch  2x30\"      HS stretch              FW Step up       Lateral Step up  1x10 nabil             PROM  "      Towel Slides       Manual  10 min             HEP Reviewed w/ Pt Updated. See below.        HEP  Access Code: [email or web address]  URL: [email or web address]  Date: 10/23/2024  Prepared by: Gianna Neely  Exercises  - Active Straight Leg Raise with Quad Set  - 1 x daily - 7 x weekly - 3 sets - 10 reps  - Supine Heel Slide with Strap  - 1 x daily - 7 x weekly - 3 sets - 10 reps  - Seated Inferior Patellar Glide  - 1 x daily - 7 x weekly - 3 sets - 10 reps  - Seated Superior Patellar Glide  - 1 x daily - 7 x weekly - 3 sets - 10 reps  - Seated Medial Patellar Glide  - 1 x daily - 7 x weekly - 3 sets - 10 reps  - Sitting Lateral Patellar Glide  - 1 x daily - 7 x weekly - 3 sets - 10 reps  Updated: 11/13/24  - Squat with Chair Touch  - 1 x daily - 7 x weekly - 3 sets - 10 reps    Assessment:  Pt continues to tolerate initiation of interventions throughout session well reporting 5/10 pain at end of session. Reports some L knee discomfort at completion of cart pulls. Updated HEP to include squat to chair to functional strengthening with pt demonstrating and reporting good understanding.     OP EDUCATION:  Outpatient Education  Individual(s) Educated: Patient  Education Provided: Body Mechanics, Home Exercise Program, POC  Risk and Benefits Discussed with Patient/Caregiver/Other: yes  Plan of Care Discussed and Agreed Upon: yes  Patient Response to Education: Patient/Caregiver Verbalized Understanding of Information, Patient/Caregiver Asked Appropriate Questions  Education Comment: Educated pt on continuing to ice L knee to reduce swelling and soreness in L knee. Educated pt through updated interventions in session with pt demonstrating and reporting good understanding.    Plan:  Continue to progress LLE strength and maintain ROM for return to PLOF  OP PT Plan  Treatment/Interventions: Education/ Instruction, Gait training, Manual therapy, Neuromuscular re-education, Taping techniques, Therapeutic  activities, Therapeutic exercises  PT Plan: Skilled PT  PT Frequency: 2 times per week  Duration: 6-8 weeks (from IE 10/23/24)  Onset Date: 10/23/24  Rehab Potential: Fair  Plan of Care Agreement: Patient    Goals:  Active       PT Problem       Patient will maintain and progress L knee ROM  knee flexion ROM from 120 degrees for ambulation and stair/curb negotiation.  (Progressing)       Start:  10/24/24    Expected End:  01/23/25            Patient will achieve left knee flexion and extension strength of 5/5 evidenced by MMT scores for equal strength bilaterally for gait mechanics and functional mobility.  (Progressing)       Start:  10/24/24    Expected End:  01/23/25            Patient will report pain of less than or equal to 2/10 over 2 consecutive treatment sessions demonstrating a reduction of overall pain (Progressing)       Start:  10/24/24    Expected End:  01/23/25            Patient will show a significant change in LEFS patient reported outcome tool to demonstrate subjective improvement       Start:  10/24/24    Expected End:  01/23/25               PT Problem       Patient will demonstrate independence in home program for support of progression (Progressing)       Start:  10/24/24    Expected End:  01/23/25

## 2024-11-20 ENCOUNTER — TREATMENT (OUTPATIENT)
Dept: PHYSICAL THERAPY | Facility: HOSPITAL | Age: 69
End: 2024-11-20
Payer: COMMERCIAL

## 2024-11-20 DIAGNOSIS — M25.662 IMPAIRED RANGE OF MOTION OF LEFT KNEE: ICD-10-CM

## 2024-11-20 DIAGNOSIS — M17.12 PRIMARY LOCALIZED OSTEOARTHRITIS OF LEFT KNEE: Primary | ICD-10-CM

## 2024-11-20 PROCEDURE — 97110 THERAPEUTIC EXERCISES: CPT | Mod: GP

## 2024-11-20 ASSESSMENT — PAIN DESCRIPTION - DESCRIPTORS: DESCRIPTORS: ACHING

## 2024-11-20 ASSESSMENT — PAIN SCALES - GENERAL: PAINLEVEL_OUTOF10: 5 - MODERATE PAIN

## 2024-11-20 ASSESSMENT — PAIN - FUNCTIONAL ASSESSMENT: PAIN_FUNCTIONAL_ASSESSMENT: 0-10

## 2024-11-20 NOTE — PROGRESS NOTES
"Physical Therapy    Physical Therapy Treatment    Patient Name: Sher Cameron  MRN: [id number]  Today's Date: 11/20/2024  Time Calculation  Start Time: 1305  Stop Time: 1344  Time Calculation (min): 39 min  PT Therapeutic Procedures Time Entry  Therapeutic Exercise Time Entry: 39  Auth Visit Dates: 10/23/24-10/23/25  VISIT# 4    Current Problem  Problem List Items Addressed This Visit             ICD-10-CM    Primary localized osteoarthritis of left knee - Primary M17.12    Impaired range of motion of left knee M25.662       Subjective   Pt reports his knee feels better this date compared to yesterday likely due to weather. Pt notes he feels most pain when getting out of vehicle when standing and rotating on knee.     Precautions  Precautions  STEADI Fall Risk Score (The score of 4 or more indicates an increased risk of falling): 4  Medical Precautions:  (HTN, OA, RA, Thyroid Disorder)       Pain  Pain Assessment: 0-10  0-10 (Numeric) Pain Score: 5 - Moderate pain  Pain Type: Chronic pain  Pain Location: Knee  Pain Orientation: Left  Pain Descriptors: Aching  Pain Frequency: Intermittent       Objective   Initiated eccentric FW and lateral step downs on 6 inch step  Progressed cart pushes/pulls to 40#     Treatments:  EXERCISES       Date 11/6/2024 11/13/24 11/20/24           VISIT # #2 #3 #4 #    REPS REPS REPS REPS          Nustep L5 10 min L5 10 min  L5 5 min     Bike              Shuttle  DLP 6B 2x10      Shuttle SLP 5B 2x10 Nabil  5B 2x15 nabil     Shuttle TR/HR 4B 2x10             Qhip Flexion 15# 2x10 Nabil  15# 2x10 nabil    Qhip Abduction 15# 2x10 Nabil 15# 2x10 nabil  15# 2x10 nabil    Qhip Extension 15# 2x10 Nabil 15# 2x10 nabil 15# 2x10 nabil    Qhip  TKE  15# 1x10 nabil 3\" H 15# 1x10 nabil 3\" H           SLR with Quad Set 2x10 L HEP     Quad Sets 2x10 L      Squat to chair  1x10      Cart push/pulls   15# 155 feet ea 40# 155 feet ea           RB stretch  2x30\"  3x30\"     HS stretch              FW Step up   6in step 2x10 " madhu  Eccentric step down     Lateral Step up  1x10 madhu  6in step 2x10 madhu  Eccentric step down            PROM       Towel Slides       Manual  10 min             HEP Reviewed w/ Pt Updated. See below.        HEP  Access Code: [email or web address]  URL: [email or web address]  Date: 10/23/2024  Prepared by: Gianna Neely  Exercises  - Active Straight Leg Raise with Quad Set  - 1 x daily - 7 x weekly - 3 sets - 10 reps  - Supine Heel Slide with Strap  - 1 x daily - 7 x weekly - 3 sets - 10 reps  - Seated Inferior Patellar Glide  - 1 x daily - 7 x weekly - 3 sets - 10 reps  - Seated Superior Patellar Glide  - 1 x daily - 7 x weekly - 3 sets - 10 reps  - Seated Medial Patellar Glide  - 1 x daily - 7 x weekly - 3 sets - 10 reps  - Sitting Lateral Patellar Glide  - 1 x daily - 7 x weekly - 3 sets - 10 reps  Updated: 11/13/24  - Squat with Chair Touch  - 1 x daily - 7 x weekly - 3 sets - 10 reps    Assessment:  Pt continues to tolerate treatment session well reporting 3/10 achiness at end of session without sharp pains.     OP EDUCATION:  Outpatient Education  Individual(s) Educated: Patient  Education Provided: Body Mechanics, Home Exercise Program, POC  Risk and Benefits Discussed with Patient/Caregiver/Other: yes  Plan of Care Discussed and Agreed Upon: yes  Patient Response to Education: Patient/Caregiver Verbalized Understanding of Information, Patient/Caregiver Asked Appropriate Questions  Education Comment: Educated pt through progression of interventions and purpose of interventions with pt demonstrating and reporting understanding.    Plan:  Continue to improve L knee ROM and strength to reduce pain and improve functional mobility  Reassess goals and objective measurements from IE to determine progress  OP PT Plan  Treatment/Interventions: Education/ Instruction, Gait training, Manual therapy, Neuromuscular re-education, Taping techniques, Therapeutic activities, Therapeutic exercises  PT Plan: Skilled  PT  PT Frequency: 2 times per week  Duration: 6-8 weeks (from IE 10/23/24)  Onset Date: 10/23/24  Rehab Potential: Fair  Plan of Care Agreement: Patient    Goals:  Active       PT Problem       Patient will maintain and progress L knee ROM  knee flexion ROM from 120 degrees for ambulation and stair/curb negotiation.  (Progressing)       Start:  10/24/24    Expected End:  01/23/25            Patient will achieve left knee flexion and extension strength of 5/5 evidenced by MMT scores for equal strength bilaterally for gait mechanics and functional mobility.  (Progressing)       Start:  10/24/24    Expected End:  01/23/25            Patient will report pain of less than or equal to 2/10 over 2 consecutive treatment sessions demonstrating a reduction of overall pain (Progressing)       Start:  10/24/24    Expected End:  01/23/25            Patient will show a significant change in LEFS patient reported outcome tool to demonstrate subjective improvement       Start:  10/24/24    Expected End:  01/23/25               PT Problem       Patient will demonstrate independence in home program for support of progression (Progressing)       Start:  10/24/24    Expected End:  01/23/25

## 2024-11-27 ENCOUNTER — TREATMENT (OUTPATIENT)
Dept: PHYSICAL THERAPY | Facility: HOSPITAL | Age: 69
End: 2024-11-27
Payer: COMMERCIAL

## 2024-11-27 DIAGNOSIS — M17.12 PRIMARY LOCALIZED OSTEOARTHRITIS OF LEFT KNEE: ICD-10-CM

## 2024-11-27 DIAGNOSIS — M25.662 IMPAIRED RANGE OF MOTION OF LEFT KNEE: ICD-10-CM

## 2024-11-27 PROCEDURE — 97110 THERAPEUTIC EXERCISES: CPT | Mod: GP

## 2024-11-27 ASSESSMENT — PAIN DESCRIPTION - DESCRIPTORS: DESCRIPTORS: ACHING

## 2024-11-27 ASSESSMENT — PAIN - FUNCTIONAL ASSESSMENT: PAIN_FUNCTIONAL_ASSESSMENT: 0-10

## 2024-11-27 ASSESSMENT — PAIN SCALES - GENERAL: PAINLEVEL_OUTOF10: 4

## 2024-11-27 NOTE — PROGRESS NOTES
Physical Therapy    Physical Therapy Treatment - Recheck    Patient Name: Sher Cameron  MRN: [id number]  Today's Date: 11/27/2024  Time Calculation  Start Time: 1300  Stop Time: 1343  Time Calculation (min): 43 min  PT Therapeutic Procedures Time Entry  Therapeutic Exercise Time Entry: 43  Auth Visit Dates: 10/23/24-10/23/25  VISIT# 5    Current Problem  Problem List Items Addressed This Visit             ICD-10-CM    Primary localized osteoarthritis of left knee M17.12    Impaired range of motion of left knee M25.662       Subjective   Pt reports continued LLE pain and noting sensation of feeling swelling in L knee. Pt notes continued HEP compliance stating he does feel like his nabil LEs are stronger and recover from pain faster at this time.      Precautions  Precautions  STEADI Fall Risk Score (The score of 4 or more indicates an increased risk of falling): 4  Medical Precautions:  (HTN, OA, RA, Thyroid Disorder)       Pain  Pain Assessment: 0-10  0-10 (Numeric) Pain Score: 4  Pain Type: Chronic pain  Pain Location: Knee  Pain Orientation: Left  Pain Descriptors: Aching  Pain Frequency: Intermittent       Objective   Recheck this date.     Lower Extremity Strength:  LE strength not listed below is WNL  MMT 5/5 max  LEFT RIGHT   Hip Flexion 5 5   Hip Abduction 5 5    Knee Extension 5 5   Knee Flexion 5 5     KNEE AROM:    LEFT RIGHT   Knee Flexion 140 degrees 120 degrees with pt reporting slight pain at end range   Knee Extension 0 0     Outcome Measures:  Other Measures  Lower Extremity Funtional Score (LEFS): 53/80     Treatments:  EXERCISES       Date 11/6/2024 11/13/24 11/20/24 11/27/24          VISIT # #2 #3 #4 #5    REPS REPS REPS REPS          Nustep L5 10 min L5 10 min  L5 5 min  L6 5 min    Bike              Shuttle  DLP 6B 2x10      Shuttle SLP 5B 2x10 Nabil  5B 2x15 nabil     Shuttle TR/HR 4B 2x10             Qhip Flexion 15# 2x10 Nabil  15# 2x10 nabil 20# 1x10 nabil    Qhip Abduction 15# 2x10 Nabil 15# 2x10 nabil   "15# 2x10 nabil 20# 1x10 nabil    Qhip Extension 15# 2x10 Nabil 15# 2x10 nabil 15# 2x10 nabil 20# 1x10 nabil   Qhip  TKE  15# 1x10 nabil 3\" H 15# 1x10 nabil 3\" H 20# 1x10 nabil 3\" H LLE          SLR with Quad Set 2x10 L HEP     Quad Sets 2x10 L      Squat to chair  1x10      Cart push/pulls   15# 155 feet ea 40# 155 feet ea 45# 155 feet ea          RB stretch  2x30\"  3x30\"  Knees extend 2x30\"  Knees flexed 2x30\"    HS stretch              FW Step up   6in step 2x10 nabil  Eccentric step down  6in step 1x10 nabil  Eccentric step down    Lateral Step up  1x10 nabil  6in step 2x10 nabil  Eccentric step down  6in step 1x10 nabil  Eccentric step down           PROM       Towel Slides       Manual  10 min             HEP Reviewed w/ Pt Updated. See below.        HEP  Access Code: [email or web address]  URL: [email or web address]  Date: 10/23/2024  Prepared by: Gianna Neely  Exercises  - Active Straight Leg Raise with Quad Set  - 1 x daily - 7 x weekly - 3 sets - 10 reps  - Supine Heel Slide with Strap  - 1 x daily - 7 x weekly - 3 sets - 10 reps  - Seated Inferior Patellar Glide  - 1 x daily - 7 x weekly - 3 sets - 10 reps  - Seated Superior Patellar Glide  - 1 x daily - 7 x weekly - 3 sets - 10 reps  - Seated Medial Patellar Glide  - 1 x daily - 7 x weekly - 3 sets - 10 reps  - Sitting Lateral Patellar Glide  - 1 x daily - 7 x weekly - 3 sets - 10 reps  Updated: 11/13/24  - Squat with Chair Touch  - 1 x daily - 7 x weekly - 3 sets - 10 reps    Assessment:  Recheck this date. Pt demonstrates significant improvement in LE strength with minimal pain during MMT with nabil LE strength 5/5. L knee ROM maintained to 0-120 degrees. Pt reports 2/10 pain at end of session despite progression of interventions throughout session. Pt would benefit from continued skilled PT services in order to continue to improve nabil LE strength and muscular endurance to reduce pain and improve overall functional mobility.      OP EDUCATION:  Outpatient " Education  Individual(s) Educated: Patient  Education Provided: Body Mechanics, Home Exercise Program, POC  Risk and Benefits Discussed with Patient/Caregiver/Other: yes  Plan of Care Discussed and Agreed Upon: yes  Patient Response to Education: Patient/Caregiver Verbalized Understanding of Information, Patient/Caregiver Asked Appropriate Questions  Education Comment: Pt education provided for the following: progress since IE, bdy mechanics throughout interventions, continuing POC to continue strengthening and stretching of LLE    Plan:  Continue to progress madhu LE strength 1x/week for 4 more weeks to improve functional strength   OP PT Plan  Treatment/Interventions: Education/ Instruction, Gait training, Manual therapy, Neuromuscular re-education, Taping techniques, Therapeutic activities, Therapeutic exercises  PT Plan: Skilled PT  PT Frequency: 1 time per week  Duration: 6-8 weeks (from IE 10/23/24)  Onset Date: 10/23/24  Rehab Potential: Fair  Plan of Care Agreement: Patient    Goals:  Active       PT Problem       Patient will maintain and progress L knee ROM  knee flexion ROM from 120 degrees for ambulation and stair/curb negotiation.  (Progressing)       Start:  10/24/24    Expected End:  01/23/25         Goal Note       Pt continues to maintain 120 degrees of knee flexion at this time. Will continue to monitor throughout remainder of POC.               Patient will achieve left knee flexion and extension strength of 5/5 evidenced by MMT scores for equal strength bilaterally for gait mechanics and functional mobility.  (Met)       Start:  10/24/24    Expected End:  01/23/25    Resolved:  11/27/24      Goal Note       L knee flexion and extension strength 5/5 this date with minimal pain during MMT.               Patient will report pain of less than or equal to 2/10 over 2 consecutive treatment sessions demonstrating a reduction of overall pain (Progressing)       Start:  10/24/24    Expected End:  01/23/25          Goal Note       Pt continues to report 2-4/10 pain throughout treatment sessions with pain reducing at end of session compared to start of session.               Patient will show a significant change in LEFS patient reported outcome tool to demonstrate subjective improvement (Progressing)       Start:  10/24/24    Expected End:  01/23/25         Goal Note       LEFS score reduced by 1 point this date compared to IE.                  PT Problem       Patient will demonstrate independence in home program for support of progression (Progressing)       Start:  10/24/24    Expected End:  01/23/25         Goal Note       Pt continues to report daily compliance with HEP. Will continue to monitor throughout POC.

## 2024-12-10 ENCOUNTER — TREATMENT (OUTPATIENT)
Dept: PHYSICAL THERAPY | Facility: HOSPITAL | Age: 69
End: 2024-12-10
Payer: COMMERCIAL

## 2024-12-10 DIAGNOSIS — M25.662 IMPAIRED RANGE OF MOTION OF LEFT KNEE: ICD-10-CM

## 2024-12-10 DIAGNOSIS — M17.12 PRIMARY LOCALIZED OSTEOARTHRITIS OF LEFT KNEE: ICD-10-CM

## 2024-12-10 PROCEDURE — 97110 THERAPEUTIC EXERCISES: CPT | Mod: GP

## 2024-12-10 ASSESSMENT — PAIN SCALES - GENERAL: PAINLEVEL_OUTOF10: 8

## 2024-12-10 ASSESSMENT — PAIN - FUNCTIONAL ASSESSMENT: PAIN_FUNCTIONAL_ASSESSMENT: 0-10

## 2024-12-10 NOTE — PROGRESS NOTES
"Physical Therapy    Physical Therapy Treatment    Patient Name: Sher Cameron  MRN: [id number]  Today's Date: 12/10/2024  Time Calculation  Start Time: 0950  Stop Time: 1030  Time Calculation (min): 40 min  PT Therapeutic Procedures Time Entry  Therapeutic Exercise Time Entry: 40  Auth Visit Dates: 10/23/24-10/23/25  VISIT# 6    Current Problem  Problem List Items Addressed This Visit             ICD-10-CM    Primary localized osteoarthritis of left knee M17.12    Impaired range of motion of left knee M25.662       Subjective   Pt declines falls since last session. Pt reports compliance with HEP daily. Pt states increased pain in L knee over last few days with pt contributing increase in weather without any change in activity. Pt states despite pain, he feels his madhu LEs are stronger and he is able to get more ROM in L knee.     Precautions  Precautions  STEADI Fall Risk Score (The score of 4 or more indicates an increased risk of falling): 4  Medical Precautions:  (HTN, OA, RA, Thyroid Disorder)    Pain  Pain Assessment: 0-10  0-10 (Numeric) Pain Score: 8  Pain Type: Chronic pain  Pain Location: Knee  Pain Orientation: Left  Pain Frequency: Intermittent       Objective   Initiation of SL quad stretch with strap, monster walks, lateral band walks  Progressed step downs to 8 inch step    Treatments:  EXERCISES       Date 11/13/24 11/20/24 11/27/24 12/10/24          VISIT # #3 #4 #5 #6    REPS REPS REPS REPS          Nustep L5 10 min  L5 5 min  L6 5 min  L5 5 min    Bike              Shuttle  DLP       Shuttle SLP  5B 2x15 madhu      Shuttle TR/HR              Qhip Flexion  15# 2x10 madhu 20# 1x10 madhu     Qhip Abduction 15# 2x10 madhu  15# 2x10 madhu 20# 1x10 madhu     Qhip Extension 15# 2x10 madhu 15# 2x10 madhu 20# 1x10 madhu    Qhip  TKE 15# 1x10 madhu 3\" H 15# 1x10 madhu 3\" H 20# 1x10 madhu 3\" H LLE           SLR with Quad Set HEP      Quad Sets       Squat to chair 1x10       Cart push/pulls  15# 155 feet ea 40# 155 feet ea 45# 155 " "feet ea 45# 155 feet ea   Lateral band walks    Red tband 10 feet x 2 madhu    Monster Walks    Red tband 10 feet x2 FW          RB stretch 2x30\"  3x30\"  Knees extend 2x30\"  Knees flexed 2x30\"  Knees extend 2x30\"  Knees flexed 2x30\"   HS stretch    2x30\" madhu   Side lying quad stretch     2x30\" madhu   With strap          FW Step up  6in step 2x10 madhu  Eccentric step down  6in step 1x10 madhu  Eccentric step down  8in step 1x5 madhu   Eccentric step down   Lateral Step up 1x10 madhu  6in step 2x10 madhu  Eccentric step down  6in step 1x10 madhu  Eccentric step down  8in step 1x5 madhu   Eccentric step down          PROM       Towel Slides       Manual               HEP Updated. See below.    Updated with addition of sidelying quad stretch. See below.      HEP  Access Code: [email or web address]  URL: [email or web address]  Date: 10/23/2024  Prepared by: Gianna Neely  Exercises  - Active Straight Leg Raise with Quad Set  - 1 x daily - 7 x weekly - 3 sets - 10 reps  - Supine Heel Slide with Strap  - 1 x daily - 7 x weekly - 3 sets - 10 reps  - Seated Inferior Patellar Glide  - 1 x daily - 7 x weekly - 3 sets - 10 reps  - Seated Superior Patellar Glide  - 1 x daily - 7 x weekly - 3 sets - 10 reps  - Seated Medial Patellar Glide  - 1 x daily - 7 x weekly - 3 sets - 10 reps  - Sitting Lateral Patellar Glide  - 1 x daily - 7 x weekly - 3 sets - 10 reps  Updated: 11/13/24  - Squat with Chair Touch  - 1 x daily - 7 x weekly - 3 sets - 10 reps  Updated: 12/10/24  - Sidelying Quadriceps Stretch with Strap  - 1 x daily - 7 x weekly - 3 sets - 10 reps    Assessment:  Pt tolerates treatment session well reporting 6/10 pain at end of session with pt noting sensation of swelling. Pt demonstrates good understanding of HEP at this time.     OP EDUCATION:  Outpatient Education  Individual(s) Educated: Patient  Education Provided: Body Mechanics, Home Exercise Program, POC  Risk and Benefits Discussed with Patient/Caregiver/Other: " yes  Patient/Caregiver Demonstrated Understanding: yes  Plan of Care Discussed and Agreed Upon: yes  Patient Response to Education: Patient/Caregiver Verbalized Understanding of Information, Patient/Caregiver Asked Appropriate Questions, Patient/Caregiver Performed Return Demonstration of Exercises/Activities  Education Comment: Pt education provided for the following: reaching out to ortho regarding second injection for pain per ortho for follow up, sidelying quad stretch with band to imrpove anterior knee pain/stiffness. Pt reports and demonstrates good understanding.    Plan:  Continue to stretch and strengthen LLE to reduce pain and instability for ADLs and household chores  OP PT Plan  Treatment/Interventions: Education/ Instruction, Gait training, Manual therapy, Neuromuscular re-education, Taping techniques, Therapeutic activities, Therapeutic exercises  PT Plan: Skilled PT  PT Frequency: 1 time per week  Duration: 6-8 weeks (from IE 10/23/24)  Onset Date: 10/23/24  Rehab Potential: Fair  Plan of Care Agreement: Patient    Goals:  Active       PT Problem       Patient will maintain and progress L knee ROM  knee flexion ROM from 120 degrees for ambulation and stair/curb negotiation.  (Progressing)       Start:  10/24/24    Expected End:  01/23/25            Patient will achieve left knee flexion and extension strength of 5/5 evidenced by MMT scores for equal strength bilaterally for gait mechanics and functional mobility.  (Met)       Start:  10/24/24    Expected End:  01/23/25    Resolved:  11/27/24      Goal Note       L knee flexion and extension strength 5/5 this date with minimal pain during MMT.               Patient will report pain of less than or equal to 2/10 over 2 consecutive treatment sessions demonstrating a reduction of overall pain (Progressing)       Start:  10/24/24    Expected End:  01/23/25            Patient will show a significant change in LEFS patient reported outcome tool to  demonstrate subjective improvement (Progressing)       Start:  10/24/24    Expected End:  01/23/25               PT Problem       Patient will demonstrate independence in home program for support of progression (Progressing)       Start:  10/24/24    Expected End:  01/23/25

## 2024-12-12 ENCOUNTER — TELEPHONE (OUTPATIENT)
Dept: ORTHOPEDIC SURGERY | Facility: HOSPITAL | Age: 69
End: 2024-12-12
Payer: COMMERCIAL

## 2024-12-12 NOTE — TELEPHONE ENCOUNTER
Patient called in requesting gel injections for his left knee I explained the process and told him I will let him know of the decision. I will submit a prior auth through [facility].

## 2024-12-17 ENCOUNTER — APPOINTMENT (OUTPATIENT)
Dept: PHYSICAL THERAPY | Facility: HOSPITAL | Age: 69
End: 2024-12-17
Payer: COMMERCIAL

## 2024-12-18 ENCOUNTER — APPOINTMENT (OUTPATIENT)
Facility: CLINIC | Age: 69
End: 2024-12-18
Payer: COMMERCIAL

## 2024-12-18 VITALS
WEIGHT: 182 LBS | HEART RATE: 70 BPM | SYSTOLIC BLOOD PRESSURE: 134 MMHG | BODY MASS INDEX: 26.05 KG/M2 | HEIGHT: 70 IN | OXYGEN SATURATION: 97 % | DIASTOLIC BLOOD PRESSURE: 80 MMHG

## 2024-12-18 DIAGNOSIS — D12.4 ADENOMATOUS POLYP OF DESCENDING COLON: ICD-10-CM

## 2024-12-18 DIAGNOSIS — R19.5 POSITIVE COLORECTAL CANCER SCREENING USING COLOGUARD TEST: Primary | ICD-10-CM

## 2024-12-18 PROBLEM — E66.3 OVERWEIGHT WITH BODY MASS INDEX (BMI) 25.0-29.9: Status: ACTIVE | Noted: 2024-12-18

## 2024-12-18 PROBLEM — H43.399 VITREOUS FLOATERS: Status: ACTIVE | Noted: 2023-02-10

## 2024-12-18 PROCEDURE — 1036F TOBACCO NON-USER: CPT | Performed by: INTERNAL MEDICINE

## 2024-12-18 PROCEDURE — 3075F SYST BP GE 130 - 139MM HG: CPT | Performed by: INTERNAL MEDICINE

## 2024-12-18 PROCEDURE — 99203 OFFICE O/P NEW LOW 30 MIN: CPT | Performed by: INTERNAL MEDICINE

## 2024-12-18 PROCEDURE — 1159F MED LIST DOCD IN RCRD: CPT | Performed by: INTERNAL MEDICINE

## 2024-12-18 PROCEDURE — 1123F ACP DISCUSS/DSCN MKR DOCD: CPT | Performed by: INTERNAL MEDICINE

## 2024-12-18 PROCEDURE — 3079F DIAST BP 80-89 MM HG: CPT | Performed by: INTERNAL MEDICINE

## 2024-12-18 PROCEDURE — 1160F RVW MEDS BY RX/DR IN RCRD: CPT | Performed by: INTERNAL MEDICINE

## 2024-12-18 PROCEDURE — 3008F BODY MASS INDEX DOCD: CPT | Performed by: INTERNAL MEDICINE

## 2024-12-18 ASSESSMENT — ENCOUNTER SYMPTOMS
CONFUSION: 0
NERVOUS/ANXIOUS: 0
ARTHRALGIAS: 0
TROUBLE SWALLOWING: 0
VOICE CHANGE: 0
DYSURIA: 0
FEVER: 0
CHILLS: 0
ROS GI COMMENTS: AS DETAILED ABOVE.
NUMBNESS: 0
COUGH: 0
HEADACHES: 0
FATIGUE: 0
SORE THROAT: 0
ADENOPATHY: 0
WEAKNESS: 0
SEIZURES: 0
SHORTNESS OF BREATH: 0
UNEXPECTED WEIGHT CHANGE: 0
BRUISES/BLEEDS EASILY: 0
HEMATURIA: 0
DIZZINESS: 0
WHEEZING: 0
MYALGIAS: 0
PALPITATIONS: 0

## 2024-12-18 NOTE — PROGRESS NOTES
[facility] Gastroenterology    ASSESSMENT and PLAN:       Sher Cameron is a 69 y.o. male with a significant past medical history of HTN, hypothyroidism, prediabetes, depression, HLD, OA, and TB who presents for consultation requested by his primary care provider (Zach Angela MD) for the evaluation of a positive Cologuard.       Positive Cologuard  Patient with a history of adenomatous polyps and as such he would not technically have been a candidate for Cologuard testing. He is overdue for surveillance and colonoscopy also needed with the positive Cologuard. Asymptomatic at this time.  No previous difficulties with sedation.  No antiplatelet/antithrombotic use.  - colonoscopy scheduled in endo  - OTC bowel prep discussed with patient  - instructions for procedure discussed with patient in the office today and hard copy given to patient today        Follow up with GI as needed.          Warner Piedra MD        Senior Attending Physician, Gastroenterology    [facility]    Clinical   [employer]        Subjective   HISTORY OF PRESENT ILLNESS:     Chief Complaint  Colonoscopy    History Of Present Illness:    Sher Cameron is a 69 y.o. male with a significant past medical history of HTN, hypothyroidism, prediabetes, depression, HLD, OA, and TB who presents for consultation requested by his primary care provider (Zach Angela MD) for the evaluation of a positive Cologuard.     He previously had a colonoscopy in 2019 that showed three descending polyps (TA on path) and diverticulosis. Recently his PCP ordered Cologuard which came back positive.      Currently he denies any GI symptoms. He is not on any blood thinners and he has not had any difficulties with sedation/anesthesia in the past.      Patient denies any heartburn/GERD, N/V, dysphagia, odynophagia, abdominal pain, diarrhea,  constipation, hematemesis, hematochezia, melena, or weight loss.      Review of systems:   Review of Systems   Constitutional:  Negative for chills, fatigue, fever and unexpected weight change.   HENT:  Negative for congestion, sneezing, sore throat, trouble swallowing and voice change.    Respiratory:  Negative for cough, shortness of breath and wheezing.    Cardiovascular:  Negative for chest pain, palpitations and leg swelling.   Gastrointestinal:         As detailed above.   Genitourinary:  Negative for dysuria and hematuria.   Musculoskeletal:  Negative for arthralgias and myalgias.   Skin:  Negative for pallor and rash.   Neurological:  Negative for dizziness, seizures, syncope, weakness, numbness and headaches.   Hematological:  Negative for adenopathy. Does not bruise/bleed easily.   Psychiatric/Behavioral:  Negative for confusion. The patient is not nervous/anxious.    All other systems reviewed and are negative.      I performed a complete 10 point review of systems and it is negative except as noted in HPI or above.      PAST HISTORIES:       Past Medical History:  Patient Active Problem List   Diagnosis    Allergic rhinitis    Carpal tunnel syndrome of left wrist    Carpal tunnel syndrome of right wrist    Colon polyps    Major depressive disorder, single episode, moderate (Multi)    Diverticulosis    Eczema    Ganglion cyst of finger    Hand arthritis    History of tuberculosis    Hyperlipidemia    Hypertension    Hypothyroidism    Osteoarthritis of finger of left hand    Osteoarthritis of finger of right hand    Periorbital hyperpigmentation    Prediabetes    Vitamin D deficiency    Vitreous floaters    Medicare annual wellness visit, subsequent    Umbilical hernia without obstruction and without gangrene    Screening for prostate cancer    Encounter for immunization    Medicare annual wellness visit, initial    Annual physical exam    Trigger ring finger of left hand    Trigger ring finger of right  "hand    Primary localized osteoarthritis of left knee    Impaired range of motion of left knee    Positive colorectal cancer screening using Cologuard test    Overweight with body mass index (BMI) 25.0-29.9     He has a past medical history of Arthritis, Depression, and Hypertension.    Past Surgical History:  He has a past surgical history that includes Eye surgery.      Social History:  He reports that he has never smoked. He has been exposed to tobacco smoke. He has never used smokeless tobacco. He reports that he does not drink alcohol and does not use drugs.    Family History:  No known family history of GI disease, specifically denies any family history of pancreatitis, Crohn's, colon cancer, gastroesophageal cancer, or ulcerative colitis.    Family History   Problem Relation Name Age of Onset    Cancer Mother      Heart disease Father          Allergies:  Patient has no known allergies.      Objective   OBJECTIVE:       Last Recorded Vitals:  Vitals:    12/18/24 1506   BP: 134/80   Pulse: 70   SpO2: 97%   Weight: 82.6 kg (182 lb)   Height: 1.778 m (5' 10\")     /80   Pulse 70   Ht 1.778 m (5' 10\")   Wt 82.6 kg (182 lb)   SpO2 97%   BMI 26.11 kg/m²      Physical Exam:    Physical Exam  Vitals reviewed.   Constitutional:       General: He is not in acute distress.     Appearance: He is not ill-appearing.   HENT:      Head: Normocephalic and atraumatic.   Eyes:      General: No scleral icterus.  Cardiovascular:      Rate and Rhythm: Normal rate and regular rhythm.      Pulses: Normal pulses.      Heart sounds: Normal heart sounds. No murmur heard.  Pulmonary:      Effort: Pulmonary effort is normal. No respiratory distress.      Breath sounds: Normal breath sounds. No wheezing.   Abdominal:      General: Bowel sounds are normal.      Palpations: Abdomen is soft.      Tenderness: There is no abdominal tenderness. There is no rebound.   Musculoskeletal:         General: No swelling or deformity. "   Skin:     General: Skin is warm and dry.      Coloration: Skin is not jaundiced.      Findings: No rash.   Neurological:      General: No focal deficit present.      Mental Status: He is alert and oriented to person, place, and time.   Psychiatric:         Mood and Affect: Mood normal.         Behavior: Behavior normal.         Thought Content: Thought content normal.         Judgment: Judgment normal.         Home Medications:  Prior to Admission medications    Medication Sig Start Date End Date Taking? Authorizing Provider   atorvastatin (Lipitor) 40 mg tablet Take 1 tablet (40 mg) by mouth once daily at bedtime. 7/18/24   Zach Angela MD   cholecalciferol (Vitamin D-3) 50 MCG (2000 UT) tablet Take 1 tablet (50 mcg) by mouth once daily. 6/18/20   Historical Provider, MD   diclofenac (Voltaren) 75 mg EC tablet Take 1 tablet (75 mg) by mouth 2 times a day as needed (pain). Do not crush, chew, or split. 6/26/24 12/23/24  Zach Angela MD   levothyroxine (Synthroid, Levoxyl) 150 mcg tablet Take 1 tablet (150 mcg) by mouth once daily. 2/26/24 2/20/25  Zach Angela MD   lisinopril 20 mg tablet Take 1 tablet (20 mg) by mouth once daily. 2/8/24 2/7/25  Zach Angela MD   sertraline (Zoloft) 100 mg tablet Take 1 tablet (100 mg) by mouth once daily. 6/18/20   Historical Provider, MD   sertraline (Zoloft) 50 mg tablet Take 1 tablet (50 mg) by mouth once daily. 2/26/24   Zach Angela MD   triamcinolone (Kenalog) 0.1 % cream Apply topically 2 times a day. Apply to affected area 1-2 times daily as needed. 7/26/23   Zach Angela MD         Relevant Results Recent labs reviewed in the EMR.    Lab Results   Component Value Date/Time    WBC 5.3 06/25/2024 0955    HGB 14.4 06/25/2024 0955    HGB 13.9 07/25/2023 0855    HGB 14.4 07/06/2022 0837    HGB 13.8 07/14/2021 0849    MCV 83 06/25/2024 0955     06/25/2024 0955     07/25/2023 0855     07/06/2022 0837     07/14/2021 0849     IRON 66 07/06/2022 0837    FERRITIN 76 07/06/2022 0837       Lab Results   Component Value Date/Time     10/30/2024 0910    K 4.0 10/30/2024 0910     10/30/2024 0910    BUN 18 10/30/2024 0910    CREATININE 0.92 10/30/2024 0910    CREATININE 0.90 06/25/2024 0955       Lab Results   Component Value Date/Time    BILITOT 0.6 10/30/2024 0910    BILITOT 0.6 06/25/2024 0955    BILITOT 0.5 02/07/2024 0919    ALKPHOS 38 10/30/2024 0910    ALKPHOS 37 06/25/2024 0955    ALKPHOS 40 02/07/2024 0919    AST 21 10/30/2024 0910    AST 23 06/25/2024 0955    AST 23 02/07/2024 0919    ALT 26 10/30/2024 0910    ALT 27 06/25/2024 0955    ALT 26 02/07/2024 0919       Lab Results   Component Value Date/Time    CRP 0.19 10/30/2024 0910       Radiology: Imaging reviewed in the EMR.

## 2024-12-18 NOTE — PATIENT INSTRUCTIONS
You have been scheduled for a colonoscopy.  You were given instructions for preparing for this test in the office today.  If you have questions about these instructions, please call my office at [phone].    After your procedure, you can expect me to talk to you to go over the results of the procedure. If polyps were removed I will usually be able to tell you my initial thoughts about those polyps and give you some idea of when you should have another colonoscopy.    If any polyps are removed during your procedure or if any biopsies are obtained those specimens will go to the pathologists to review under the microscope. Once those results are available they will be sent to me electronically to review. These results will also be available to you at that time through [employer]. I briefly review all of these results by the next business day to determine if there is any urgent information that needs to be communicated to you right away or anything that would significantly change what I told you at the time of the procedure. If there is nothing urgent this is usually a good sign and I will then do a more extensive review of the result and send you a letter with my final recommendations within a week of the result becoming available. If you have questions or need additional information I urge you to call the office at [phone], but I do ask for patience as I spend a good portion of each day performing procedures like the one you are scheduled for and during those times I am not able to take or return routine phone calls.    You were also given information regarding the schedule for your procedure including the time that you need to arrive to the endoscopy unit.  You will also be contacted 2-3 day prior to your procedure to confirm the final arrival time.  If you have questions about this or if you need to cancel or change this appointment please call my office at [phone].       Follow up with GI as  needed.

## 2024-12-19 ENCOUNTER — TREATMENT (OUTPATIENT)
Dept: PHYSICAL THERAPY | Facility: HOSPITAL | Age: 69
End: 2024-12-19
Payer: COMMERCIAL

## 2024-12-19 DIAGNOSIS — M17.12 PRIMARY LOCALIZED OSTEOARTHRITIS OF LEFT KNEE: Primary | ICD-10-CM

## 2024-12-19 DIAGNOSIS — M25.662 IMPAIRED RANGE OF MOTION OF LEFT KNEE: ICD-10-CM

## 2024-12-19 PROCEDURE — 97110 THERAPEUTIC EXERCISES: CPT | Mod: GP

## 2024-12-19 ASSESSMENT — PAIN SCALES - GENERAL: PAINLEVEL_OUTOF10: 8

## 2024-12-19 ASSESSMENT — PAIN - FUNCTIONAL ASSESSMENT: PAIN_FUNCTIONAL_ASSESSMENT: 0-10

## 2024-12-19 NOTE — PROGRESS NOTES
"Physical Therapy    Physical Therapy Treatment    Patient Name: Sher Cameron  MRN: [id number]  Today's Date: 12/19/2024  Time Calculation  Start Time: 1305  Stop Time: 1345  Time Calculation (min): 40 min  PT Therapeutic Procedures Time Entry  Therapeutic Exercise Time Entry: 40  Auth Visit Dates: 10/23/24-10/2  VISIT# 7    Current Problem  Problem List Items Addressed This Visit             ICD-10-CM    Primary localized osteoarthritis of left knee - Primary M17.12    Impaired range of motion of left knee M25.662       Subjective   Pt declines falls since last session. Pt reports compliance with HEP daily without ability to complete quad stretch due to pain/discomfort. Pt states he called Dr. Byrne's office and will schedule with insurance authorization. Pt reports he feels increased knee pain when weather is cold/rainy.     Precautions  Precautions  STEADI Fall Risk Score (The score of 4 or more indicates an increased risk of falling): 4  Medical Precautions:  (HTN, OA, RA, Thyroid Disorder)    Pain  Pain Assessment: 0-10  0-10 (Numeric) Pain Score: 8  Pain Type: Chronic pain  Pain Location: Knee  Pain Orientation: Left  Pain Frequency: Intermittent       Objective   Initiation of recumbent bike  Utilization of 6 in step this date for FW eccentric step down to reduce pain and improve functional performing activity  IT Band stretch attempted for lateral knee stretch with pt noting feeling in hamstring > IT band    Treatments:  EXERCISES        Date 11/13/24 11/20/24 11/27/24 12/10/24 12/19/24           VISIT # #3 #4 #5 #6 #7    REPS REPS REPS REPS REPS           Nustep L5 10 min  L5 5 min  L6 5 min  L5 5 min     Bike     5 min           Shuttle  DLP        Shuttle SLP  5B 2x15 madhu    5B 2x15   Shuttle TR/HR                Qhip Flexion  15# 2x10 madhu 20# 1x10 madhu      Qhip Abduction 15# 2x10 madhu  15# 2x10 madhu 20# 1x10 madhu      Qhip Extension 15# 2x10 madhu 15# 2x10 madhu 20# 1x10 madhu     Qhip  TKE 15# 1x10 madhu 3\" H " "15# 1x10 madhu 3\" H 20# 1x10 madhu 3\" H LLE  20# 1x10 3\"H LLE           SLR with Quad Set HEP       Quad Sets        Squat to chair 1x10        Cart push/pulls  15# 155 feet ea 40# 155 feet ea 45# 155 feet ea 45# 155 feet ea    Lateral band walks    Red tband 10 feet x 2 madhu  Red tband 25 feet x 2 madhu    Monster Walks    Red tband 10 feet x2 FW            RB stretch 2x30\"  3x30\"  Knees extend 2x30\"  Knees flexed 2x30\"  Knees extend 2x30\"  Knees flexed 2x30\" Knees extend 2x30\"  Knees flexed 2x30\"   HS stretch    2x30\" madhu 2x30\" madhu   Side lying quad stretch     2x30\" madhu   With strap Reviewed in session.            FW Step up  6in step 2x10 madhu  Eccentric step down  6in step 1x10 madhu  Eccentric step down  8in step 1x5 madhu   Eccentric step down 6in step 1x10 madhu   Eccentric step down   Lateral Step up 1x10 madhu  6in step 2x10 madhu  Eccentric step down  6in step 1x10 madhu  Eccentric step down  8in step 1x5 madhu   Eccentric step down 8in step 1x10 madhu   Eccentric step down           PROM        Towel Slides        Manual                 HEP Updated. See below.    Updated with addition of sidelying quad stretch. See below.  Verbal addition of modified piriformis stretch due to lateral knee stretch.      HEP  Access Code: [email or web address]  URL: [email or web address]  Date: 10/23/2024  Prepared by: Gianna Neely  Exercises  - Active Straight Leg Raise with Quad Set  - 1 x daily - 7 x weekly - 3 sets - 10 reps  - Supine Heel Slide with Strap  - 1 x daily - 7 x weekly - 3 sets - 10 reps  - Seated Inferior Patellar Glide  - 1 x daily - 7 x weekly - 3 sets - 10 reps  - Seated Superior Patellar Glide  - 1 x daily - 7 x weekly - 3 sets - 10 reps  - Seated Medial Patellar Glide  - 1 x daily - 7 x weekly - 3 sets - 10 reps  - Sitting Lateral Patellar Glide  - 1 x daily - 7 x weekly - 3 sets - 10 reps  Updated: 11/13/24  - Squat with Chair Touch  - 1 x daily - 7 x weekly - 3 sets - 10 reps  Updated: 12/10/24  - Sidelying " Quadriceps Stretch with Strap  - 1 x daily - 7 x weekly - 3 sets - 10 reps    Assessment:  Pt tolerates treatment session well reporting reduced pain to 5/10 at end of session. Reviewed modified piriformis stretch and side lying quad stretch with strap for quad and lateral knee stretching to improve mobility and reduce pain with pt demonstrating and reporting understanding at this time.     OP EDUCATION:  Outpatient Education  Individual(s) Educated: Patient  Education Provided: Body Mechanics, Home Exercise Program, POC  Risk and Benefits Discussed with Patient/Caregiver/Other: yes  Patient/Caregiver Demonstrated Understanding: yes  Plan of Care Discussed and Agreed Upon: yes  Patient Response to Education: Patient/Caregiver Verbalized Understanding of Information, Patient/Caregiver Asked Appropriate Questions, Patient/Caregiver Performed Return Demonstration of Exercises/Activities  Education Comment: Pt education provided for the following: sidelying quad stretch with strap in what range pt can tolerate for stretch regardless if minimal, modified pirirofrmis stretch for lateral knee stretch. Pt demonstrates and reports understanding at this time.    Plan:  Continue to progress madhu LE strength and LLE ROM to improve functional mobility  OP PT Plan  Treatment/Interventions: Education/ Instruction, Gait training, Manual therapy, Neuromuscular re-education, Taping techniques, Therapeutic activities, Therapeutic exercises  PT Plan: Skilled PT  PT Frequency: 1 time per week  Duration: 6-8 weeks (from IE 10/23/24)  Onset Date: 10/23/24  Rehab Potential: Fair  Plan of Care Agreement: Patient    Goals:  Active       PT Problem       Patient will maintain and progress L knee ROM  knee flexion ROM from 120 degrees for ambulation and stair/curb negotiation.  (Progressing)       Start:  10/24/24    Expected End:  01/23/25            Patient will achieve left knee flexion and extension strength of 5/5 evidenced by MMT scores  for equal strength bilaterally for gait mechanics and functional mobility.  (Met)       Start:  10/24/24    Expected End:  01/23/25    Resolved:  11/27/24      Goal Note       L knee flexion and extension strength 5/5 this date with minimal pain during MMT.               Patient will report pain of less than or equal to 2/10 over 2 consecutive treatment sessions demonstrating a reduction of overall pain (Progressing)       Start:  10/24/24    Expected End:  01/23/25            Patient will show a significant change in LEFS patient reported outcome tool to demonstrate subjective improvement (Progressing)       Start:  10/24/24    Expected End:  01/23/25               PT Problem       Patient will demonstrate independence in home program for support of progression (Progressing)       Start:  10/24/24    Expected End:  01/23/25

## 2024-12-24 ENCOUNTER — APPOINTMENT (OUTPATIENT)
Dept: PHYSICAL THERAPY | Facility: HOSPITAL | Age: 69
End: 2024-12-24
Payer: COMMERCIAL

## 2024-12-26 ENCOUNTER — TREATMENT (OUTPATIENT)
Dept: PHYSICAL THERAPY | Facility: HOSPITAL | Age: 69
End: 2024-12-26
Payer: COMMERCIAL

## 2024-12-26 DIAGNOSIS — M17.12 PRIMARY LOCALIZED OSTEOARTHRITIS OF LEFT KNEE: Primary | ICD-10-CM

## 2024-12-26 DIAGNOSIS — M25.662 IMPAIRED RANGE OF MOTION OF LEFT KNEE: ICD-10-CM

## 2024-12-26 PROCEDURE — 97110 THERAPEUTIC EXERCISES: CPT | Mod: GP

## 2024-12-26 ASSESSMENT — PAIN - FUNCTIONAL ASSESSMENT: PAIN_FUNCTIONAL_ASSESSMENT: 0-10

## 2024-12-26 ASSESSMENT — PAIN SCALES - GENERAL: PAINLEVEL_OUTOF10: 6

## 2024-12-26 NOTE — PROGRESS NOTES
"Physical Therapy    Physical Therapy Treatment - Discharge Summary     Patient Name: Sher Cameron  MRN: [id number]  Today's Date: 12/26/2024  Time Calculation  Start Time: 1000  Stop Time: 1043  Time Calculation (min): 43 min  PT Therapeutic Procedures Time Entry  Therapeutic Exercise Time Entry: 43  Auth Visit Dates: 10/23/24-10/23/25  VISIT# 8    Current Problem  Problem List Items Addressed This Visit             ICD-10-CM    Primary localized osteoarthritis of left knee - Primary M17.12    Impaired range of motion of left knee M25.662       Subjective   Pt declines falls since last session. Pt reports compliance with HEP daily. Pt reports L knee continues to feel the same pain despite stretching and strengthening interventions but notes reduced difficulty with functional mobility due to improvements in strength. Pt notes he did something to L side of low back that is not painful at time of session but was painful upon waking up this morning. Pt reports feeling comfortable with discharge this date.     Precautions  Precautions  STEADI Fall Risk Score (The score of 4 or more indicates an increased risk of falling): 4  Medical Precautions:  (HTN, OA, RA, Thyroid Disorder)    Pain  Pain Assessment: 0-10  0-10 (Numeric) Pain Score: 6  Pain Type: Chronic pain  Pain Location: Knee  Pain Orientation: Left  Pain Frequency: Intermittent       Objective   Recheck this date.     Lower Extremity Strength: 5/5 madhu     KNEE AROM:    LEFT RIGHT   Knee Flexion 140 degrees 120 degrees with pt reporting slight pain at end range   Knee Extension 0 0     Joint mobility Pain at lateral aspect of L patella with Grade 3-4 medial<>lateral, inferior<>superior mobs. TTP at lateral and inferior aspect of L patella.     Outcome Measures:  Other Measures  Lower Extremity Funtional Score (LEFS): 42/80     LTR: 1x5 5\" H madhu   Single knee to chest: x1 30\" H madhu     Treatments:  EXERCISES       Date 11/27/24 12/10/24 12/19/24 12/26/24        " "  VISIT # #5 #6 #7 #8    REPS REPS REPS REPS          Nustep L6 5 min  L5 5 min   L5 10 min    Bike   5 min           Shuttle  DLP       Shuttle SLP   5B 2x15    Shuttle TR/HR              Qhip Flexion 20# 1x10 madhu       Qhip Abduction 20# 1x10 madhu       Qhip Extension 20# 1x10 madhu      Qhip  TKE 20# 1x10 madhu 3\" H LLE  20# 1x10 3\"H LLE Black Tband 1x10 3\" H LLE          SLR with Quad Set       Quad Sets       Squat to chair       Cart push/pulls  45# 155 feet ea 45# 155 feet ea     Lateral band walks  Red tband 10 feet x 2 madhu  Red tband 25 feet x 2 madhu  Black tband 25 feet x 2 madhu    Monster Walks  Red tband 10 feet x2 FW            RB stretch Knees extend 2x30\"  Knees flexed 2x30\"  Knees extend 2x30\"  Knees flexed 2x30\" Knees extend 2x30\"  Knees flexed 2x30\"    HS stretch  2x30\" madhu 2x30\" madhu    Side lying quad stretch   2x30\" madhu   With strap Reviewed in session.            FW Step up 6in step 1x10 madhu  Eccentric step down  8in step 1x5 madhu   Eccentric step down 6in step 1x10 madhu   Eccentric step down    Lateral Step up 6in step 1x10 madhu  Eccentric step down  8in step 1x5 madhu   Eccentric step down 8in step 1x10 madhu   Eccentric step down           PROM       Towel Slides       Manual               HEP  Updated with addition of sidelying quad stretch. See below.  Verbal addition of modified piriformis stretch due to lateral knee stretch.  Updated. See below.      HEP  Access Code: [email or web address]  URL: [email or web address]  Date: 10/23/2024  Prepared by: Gianna Neely  Exercises  - Active Straight Leg Raise with Quad Set  - 1 x daily - 7 x weekly - 3 sets - 10 reps  - Supine Heel Slide with Strap  - 1 x daily - 7 x weekly - 3 sets - 10 reps  - Seated Inferior Patellar Glide  - 1 x daily - 7 x weekly - 3 sets - 10 reps  - Seated Superior Patellar Glide  - 1 x daily - 7 x weekly - 3 sets - 10 reps  - Seated Medial Patellar Glide  - 1 x daily - 7 x weekly - 3 sets - 10 reps  - Sitting Lateral Patellar " Glide  - 1 x daily - 7 x weekly - 3 sets - 10 reps  Updated: 11/13/24  - Squat with Chair Touch  - 1 x daily - 7 x weekly - 3 sets - 10 reps  Updated: 12/10/24  - Sidelying Quadriceps Stretch with Strap  - 1 x daily - 7 x weekly - 3 sets - 10 reps'  12/26/24  - Supine Lower Trunk Rotation  - 1 x daily - 7 x weekly - 3 sets - 10 reps - 5-10 seconds  hold  - Hooklying Single Knee to Chest  - 1 x daily - 7 x weekly - 3 sets - 10 reps - 30 seconds hold  - Standing Terminal Knee Extension with Resistance  - 1 x daily - 7 x weekly - 3 sets - 10 reps - 3-5 second hold  - Side Stepping with Resistance at Thighs  - 1 x daily - 7 x weekly - 3 sets - 10 reps  - Seated Hamstring Stretch  - 1 x daily - 7 x weekly - 3 sets - 10 reps    Assessment:  Pt is a 69 year old M who has been receiving skilled PT services since 10/23/24 for L knee pain due to OA. At this time, pt demonstrates maintenance of LLE ROM with continues L knee pain despite conservative treatment. TTP at lateral and inferior aspect of L patella. Pt ambulation without L antalgic gait pattern this date. Pt tolerates treatment session well reporting 4/10 pain at end of session. Pt and this PT agree pt is appropriate for discharge as symptoms have plateaued with minimal change despite therapeutic intervention with pt reporting daily compliance with HEP throughout POC. Pt has called othro and is waiting for authorization for injections to reduce pain at L knee. Pt reports no other questions or concerns at time of discharge with all questions answered to satisfaction. At time of discharge, pt has met 3/5 PT goals with 2/5 PT goals not being met due to continued L knee pain.     OP EDUCATION:  Outpatient Education  Individual(s) Educated: Patient  Education Provided: Body Mechanics, Home Exercise Program, POC  Equipment: Thera-Band (Level 5 (Black))  Risk and Benefits Discussed with Patient/Caregiver/Other: yes  Patient/Caregiver Demonstrated Understanding: yes  Plan of  Care Discussed and Agreed Upon: yes  Patient Response to Education: Patient/Caregiver Verbalized Understanding of Information, Patient/Caregiver Asked Appropriate Questions, Patient/Caregiver Performed Return Demonstration of Exercises/Activities  Education Comment: Pt education provided for the following: pt presentation thsi date compared to IE, updated HEP interventions for low back pains this date and continued strengthening for madhu LEs. pt demonstrates and reports good understanding at this time.    Plan:  Discharge from POC  OP PT Plan  Treatment/Interventions: Education/ Instruction, Gait training, Manual therapy, Neuromuscular re-education, Taping techniques, Therapeutic activities, Therapeutic exercises  PT Plan: Skilled PT  Duration: Discharge from POC  Onset Date: 10/23/24  Rehab Potential: Fair  Plan of Care Agreement: Patient    Goals:  Resolved       PT Problem       Patient will maintain and progress L knee ROM  knee flexion ROM from 120 degrees for ambulation and stair/curb negotiation.  (Met)       Start:  10/24/24    Expected End:  01/23/25    Resolved:  12/26/24      Goal Note       Pt has maintained L knee ROM to 0-120 with pt noting flexion > 120 is painful.               Patient will achieve left knee flexion and extension strength of 5/5 evidenced by MMT scores for equal strength bilaterally for gait mechanics and functional mobility.  (Met)       Start:  10/24/24    Expected End:  01/23/25    Resolved:  11/27/24      Goal Note       L knee flexion and extension strength 5/5 this date with minimal pain during MMT.               Patient will report pain of less than or equal to 2/10 over 2 consecutive treatment sessions demonstrating a reduction of overall pain (Not met)       Start:  10/24/24    Expected End:  01/23/25    Resolved:  12/26/24      Goal Note       Pt reports increased L knee pain over last few sessions that reduced throughout session with activity, however, pain always greater  than 2/10.               Patient will show a significant change in LEFS patient reported outcome tool to demonstrate subjective improvement (Not met)       Start:  10/24/24    Expected End:  01/23/25    Resolved:  12/26/24      Goal Note       LEFS score regression by 12 points since IE.                  PT Problem       Patient will demonstrate independence in home program for support of progression (Met)       Start:  10/24/24    Expected End:  01/23/25    Resolved:  12/26/24      Goal Note       Pt demonstrates and reports good understanding of HEP reporting continued compliance daily.

## 2024-12-31 ENCOUNTER — APPOINTMENT (OUTPATIENT)
Dept: PHYSICAL THERAPY | Facility: HOSPITAL | Age: 69
End: 2024-12-31
Payer: COMMERCIAL

## 2025-01-02 ENCOUNTER — APPOINTMENT (OUTPATIENT)
Dept: PHYSICAL THERAPY | Facility: HOSPITAL | Age: 70
End: 2025-01-02
Payer: COMMERCIAL

## 2025-01-07 ENCOUNTER — OFFICE VISIT (OUTPATIENT)
Dept: ORTHOPEDIC SURGERY | Facility: HOSPITAL | Age: 70
End: 2025-01-07
Payer: COMMERCIAL

## 2025-01-07 VITALS — WEIGHT: 182 LBS | BODY MASS INDEX: 26.05 KG/M2 | HEIGHT: 70 IN

## 2025-01-07 DIAGNOSIS — M17.12 PRIMARY OSTEOARTHRITIS OF LEFT KNEE: Primary | ICD-10-CM

## 2025-01-07 PROCEDURE — 3008F BODY MASS INDEX DOCD: CPT | Performed by: SPECIALIST

## 2025-01-07 PROCEDURE — 1123F ACP DISCUSS/DSCN MKR DOCD: CPT | Performed by: SPECIALIST

## 2025-01-07 PROCEDURE — 99213 OFFICE O/P EST LOW 20 MIN: CPT | Performed by: SPECIALIST

## 2025-01-07 PROCEDURE — 2500000004 HC RX 250 GENERAL PHARMACY W/ HCPCS (ALT 636 FOR OP/ED): Performed by: SPECIALIST

## 2025-01-07 PROCEDURE — 1159F MED LIST DOCD IN RCRD: CPT | Performed by: SPECIALIST

## 2025-01-07 PROCEDURE — 1036F TOBACCO NON-USER: CPT | Performed by: SPECIALIST

## 2025-01-07 PROCEDURE — 20610 DRAIN/INJ JOINT/BURSA W/O US: CPT | Mod: LT | Performed by: SPECIALIST

## 2025-01-07 RX ORDER — LIDOCAINE HYDROCHLORIDE 10 MG/ML
1 INJECTION, SOLUTION INFILTRATION; PERINEURAL
Status: COMPLETED | OUTPATIENT
Start: 2025-01-07 | End: 2025-01-07

## 2025-01-07 RX ADMIN — LIDOCAINE HYDROCHLORIDE 1 ML: 10 INJECTION, SOLUTION INFILTRATION; PERINEURAL at 16:26

## 2025-01-07 RX ADMIN — Medication 60 MG: at 16:26

## 2025-01-07 NOTE — PROGRESS NOTES
Left Knee Pain   DUROLANE   LOT: [id number]  EXP: 05/31/2027    patient states no known injury pain for about a year difficulty bending and straightening the knee pain moves downward no specific treatment here for assessment of knee and x-rays.     HPI as noted above  EXAM:     GENERAL: A/Ox3, NAD. Appears healthy, well nourished  SKIN: no erythema, rashes, or ecchymoses      MUSCULOSKELETAL:  Laterality: Left knee Exam  - Alignment: partially correctible varus deformity  - ROM: Full with mild crepitus and pain  - Effusion: none  - Strength: knee extension and flexion 5/5, EHL/PF/DF motor intact  - Palpation: TTP mostly along medial joint line  - Stability: Anterior/Posterior stable, varus/valgus stable. Mild pseudo valgus instability  - Gait: mild antalgic  - Hip Exam: flexion to 100+ degrees, full extension, internal/external rotation adequate, and no pain with log roll  - Special Tests: none performed    Osteoarthritis left knee.  Patient is approved for hyaluronic acid/Durolane injection today.  Follow-up if no improvement    L Inj/Asp: L knee on 1/7/2025 4:26 PM  Indications: pain  Details: 22 G needle, anterolateral approach  Medications: 1 mL lidocaine 10 mg/mL (1 %); 60 mg sodium hyaluronate 60 mg/3 mL  Outcome: tolerated well, no immediate complications  Procedure, treatment alternatives, risks and benefits explained, specific risks discussed. Consent was given by the patient. Immediately prior to procedure a time out was called to verify the correct patient, procedure, equipment, support staff and site/side marked as required. Patient was prepped and draped in the usual sterile fashion.

## 2025-01-22 ENCOUNTER — APPOINTMENT (OUTPATIENT)
Dept: GASTROENTEROLOGY | Facility: HOSPITAL | Age: 70
End: 2025-01-22
Payer: COMMERCIAL

## 2025-01-27 ENCOUNTER — TELEPHONE (OUTPATIENT)
Dept: ORTHOPEDIC SURGERY | Facility: HOSPITAL | Age: 70
End: 2025-01-27
Payer: COMMERCIAL

## 2025-01-27 NOTE — TELEPHONE ENCOUNTER
Patient called in stating he had injections in his left knee on 1/7/25 and states they are not working and would like to know what to do next? Please advise.

## 2025-02-06 ENCOUNTER — OFFICE VISIT (OUTPATIENT)
Dept: ORTHOPEDIC SURGERY | Facility: HOSPITAL | Age: 70
End: 2025-02-06
Payer: COMMERCIAL

## 2025-02-06 VITALS — WEIGHT: 182 LBS | BODY MASS INDEX: 26.05 KG/M2 | HEIGHT: 70 IN

## 2025-02-06 DIAGNOSIS — M19.032 CMC DJD(CARPOMETACARPAL DEGENERATIVE JOINT DISEASE), LOCALIZED PRIMARY, LEFT: ICD-10-CM

## 2025-02-06 DIAGNOSIS — M19.031 CMC DJD(CARPOMETACARPAL DEGENERATIVE JOINT DISEASE), LOCALIZED PRIMARY, RIGHT: Primary | ICD-10-CM

## 2025-02-06 PROCEDURE — 1159F MED LIST DOCD IN RCRD: CPT | Performed by: ORTHOPAEDIC SURGERY

## 2025-02-06 PROCEDURE — 1036F TOBACCO NON-USER: CPT | Performed by: ORTHOPAEDIC SURGERY

## 2025-02-06 PROCEDURE — 1160F RVW MEDS BY RX/DR IN RCRD: CPT | Performed by: ORTHOPAEDIC SURGERY

## 2025-02-06 PROCEDURE — 99214 OFFICE O/P EST MOD 30 MIN: CPT | Performed by: ORTHOPAEDIC SURGERY

## 2025-02-06 PROCEDURE — 2500000004 HC RX 250 GENERAL PHARMACY W/ HCPCS (ALT 636 FOR OP/ED): Performed by: ORTHOPAEDIC SURGERY

## 2025-02-06 PROCEDURE — 1123F ACP DISCUSS/DSCN MKR DOCD: CPT | Performed by: ORTHOPAEDIC SURGERY

## 2025-02-06 PROCEDURE — 3008F BODY MASS INDEX DOCD: CPT | Performed by: ORTHOPAEDIC SURGERY

## 2025-02-06 PROCEDURE — 20600 DRAIN/INJ JOINT/BURSA W/O US: CPT | Mod: 50 | Performed by: ORTHOPAEDIC SURGERY

## 2025-02-06 RX ORDER — TRIAMCINOLONE ACETONIDE 40 MG/ML
40 INJECTION, SUSPENSION INTRA-ARTICULAR; INTRAMUSCULAR
Status: COMPLETED | OUTPATIENT
Start: 2025-02-06 | End: 2025-02-06

## 2025-02-06 RX ORDER — LIDOCAINE HYDROCHLORIDE 10 MG/ML
1 INJECTION, SOLUTION INFILTRATION; PERINEURAL
Status: COMPLETED | OUTPATIENT
Start: 2025-02-06 | End: 2025-02-06

## 2025-02-06 RX ADMIN — TRIAMCINOLONE ACETONIDE 40 MG: 40 INJECTION, SUSPENSION INTRA-ARTICULAR; INTRAMUSCULAR at 09:04

## 2025-02-06 RX ADMIN — LIDOCAINE HYDROCHLORIDE 1 ML: 10 INJECTION, SOLUTION INFILTRATION; PERINEURAL at 09:04

## 2025-02-06 ASSESSMENT — ENCOUNTER SYMPTOMS
LOSS OF SENSATION IN FEET: 0
OCCASIONAL FEELINGS OF UNSTEADINESS: 0
DEPRESSION: 0

## 2025-02-06 NOTE — PROGRESS NOTES
EPV Bilateral ring trigger   Last injected 7/17/24  Patient here today for repeat injections was injected previously states injections gave relief for about 5/6 months

## 2025-02-06 NOTE — PROGRESS NOTES
69 y.o. male presents today for follow up of bilateral base of thumb pain and bilateral ring fingers catching, clicking, locking, pain. The patient reports symptoms for months in the fingers, years in the thumbs. Pain is controlled. Patient reports no numbness and tingling.  Reports no previous surgeries, injections, or trauma to the area.  Reports pain worse with use, better at rest.   Pain dull ache, sharp at times.  States fingers will lock at times, worse at night.  Thumbs worse when opening bottles and jars.  Does a lot of woodworking and painting and can cause him pain.  He states he has had a Comfort Cool in the past which has helped some for his right.  Also takes diclofenac which helps some.    Review of Systems    Constitutional: see HPI, no fever, no chills, not feeling tired, no significant weight gain or weight loss.   Eyes: No vision changes  ENT: no nosebleeds.   Cardiovascular: no chest pain.   Respiratory: no shortness of breath and no cough.   Gastrointestinal: no abdominal pain, no nausea, no vomiting and no diarrhea.   Musculoskeletal: per HPI  Neurological: no headache, no gait disturbances  Psychiatric: no depression and no sleep disturbances.   Endocrine: no muscle weakness and no muscle cramps.   Hematologic/Lymphatic: no swollen glands and no tendency for easy bruising or excessive swelling.     Patient's past medical history, past surgical history, allergies, and medications have been reviewed unless otherwise noted in the chart.     CMC Arthritis Exam  Inspection:  no evidence of infection, no edema, no erythema, no ecchymosis, Palpation:  compartments are soft, pain with palpation over the Thumb CMC joint, Range of Motion:  full wrist and finger range of motion, Stability: Hyperextension left thumb MCP strength:  5/5  and pinch strength, Skin:  intact, Vascular:  capillary refill <2 seconds distally, Sensation:  intact to light touch distally, Tests:  negative Finkelstein's , positive  Thumb CMC Grind.        Constitutional   General appearance: Alert and in no acute distress. Well developed, well nourished.    Eyes   External Eye, Conjunctiva and lids: Normal external exam - pupils were equal in size, round, reactive to light (PERRL) with normal accommodation and extraocular movements intact (EOMI).   Ears, Nose, Mouth, and Throat   Hearing: Normal.   Neck   Neck: No neck mass was observed. Supple.   Pulmonary   Respiratory effort: No respiratory distress.   Cardiovascular   Examination of extremities: No peripheral edema.   Psychiatric   Judgment and insight: Intact.   Orientation to person, place, and time: Alert and oriented x 3.       Mood and affect: Normal.      XR - no fractures, no dislocations, bilateral thumb CMC DJD, and index finger MCP DJD    X-rays were personally reviewed by me. Radiology reports were reviewed by me as well, if available at the time.      Bilateral thumb CMC DJD  Based on the history, physical exam and imaging studies above, the patient's presentation is consistent with the above diagnosis.  I had a long discussion with the patient regarding their presentation and the treatment options.  We discussed initial nonoperative versus operative management options as well as potential further diagnostic imaging.  We again discussed her treatment options going forward along with their associated risks and benefits. After thorough discussion, the patient has elected to proceed with conservative management. All questions were answered to the patients satisfaction who seems satisfied with the plan.  They will call the office with any questions/concerns.    Discussion I discussed the diagnosis and treatment options with the patient today along with their associated risks and benefits. After thorough discussion, the patient has elected to proceed with a corticosteroid injection with Kenalog and Xylocaine, which was performed in the office today under aseptic technique and the  patient tolerated the procedure well.          Ortho Injections:           Injection site bilateral thumb CMC. Medication 1 cc 1% Xylocaine, 1 cc 40 mg Kenalog, Injection given under sterile conditions. No immediate complications noted. Post injection instructions given.  Comfort Cool as needed  Continue diclofenac  Follow-up 16 weeks as needed no x-ray    Patient ID: Sher Cameron is a 69 y.o. male.    S Inj/Asp: bilateral thumb CMC on 2/6/2025 9:04 AM  Indications: pain  Details: 25 G needle (dorsoradial) approach  Medications (Right): 40 mg triamcinolone acetonide 40 mg/mL; 1 mL lidocaine 10 mg/mL (1 %)  Medications (Left): 40 mg triamcinolone acetonide 40 mg/mL; 1 mL lidocaine 10 mg/mL (1 %)  Outcome: tolerated well, no immediate complications  Procedure, treatment alternatives, risks and benefits explained, specific risks discussed. Consent was given by the patient. Immediately prior to procedure a time out was called to verify the correct patient, procedure, equipment, support staff and site/side marked as required. Patient was prepped and draped in the usual sterile fashion.

## 2025-02-10 ENCOUNTER — PREP FOR PROCEDURE (OUTPATIENT)
Facility: CLINIC | Age: 70
End: 2025-02-10
Payer: COMMERCIAL

## 2025-02-10 ENCOUNTER — ANESTHESIA EVENT (OUTPATIENT)
Dept: GASTROENTEROLOGY | Facility: HOSPITAL | Age: 70
End: 2025-02-10
Payer: COMMERCIAL

## 2025-02-12 ENCOUNTER — ANESTHESIA (OUTPATIENT)
Dept: GASTROENTEROLOGY | Facility: HOSPITAL | Age: 70
End: 2025-02-12
Payer: COMMERCIAL

## 2025-02-12 ENCOUNTER — HOSPITAL ENCOUNTER (OUTPATIENT)
Dept: GASTROENTEROLOGY | Facility: HOSPITAL | Age: 70
Discharge: HOME | End: 2025-02-12
Payer: COMMERCIAL

## 2025-02-12 VITALS
DIASTOLIC BLOOD PRESSURE: 86 MMHG | OXYGEN SATURATION: 98 % | BODY MASS INDEX: 26.05 KG/M2 | HEIGHT: 70 IN | RESPIRATION RATE: 16 BRPM | WEIGHT: 182 LBS | SYSTOLIC BLOOD PRESSURE: 135 MMHG | TEMPERATURE: 97.7 F | HEART RATE: 71 BPM

## 2025-02-12 DIAGNOSIS — R19.5 POSITIVE COLORECTAL CANCER SCREENING USING COLOGUARD TEST: ICD-10-CM

## 2025-02-12 DIAGNOSIS — Z12.11 ENCOUNTER FOR SCREENING COLONOSCOPY: ICD-10-CM

## 2025-02-12 PROCEDURE — 45385 COLONOSCOPY W/LESION REMOVAL: CPT | Performed by: INTERNAL MEDICINE

## 2025-02-12 PROCEDURE — 3700000002 HC GENERAL ANESTHESIA TIME - EACH INCREMENTAL 1 MINUTE

## 2025-02-12 PROCEDURE — 7100000010 HC PHASE TWO TIME - EACH INCREMENTAL 1 MINUTE

## 2025-02-12 PROCEDURE — 7100000009 HC PHASE TWO TIME - INITIAL BASE CHARGE

## 2025-02-12 PROCEDURE — 3700000001 HC GENERAL ANESTHESIA TIME - INITIAL BASE CHARGE

## 2025-02-12 PROCEDURE — 2500000004 HC RX 250 GENERAL PHARMACY W/ HCPCS (ALT 636 FOR OP/ED): Performed by: NURSE ANESTHETIST, CERTIFIED REGISTERED

## 2025-02-12 PROCEDURE — 45380 COLONOSCOPY AND BIOPSY: CPT | Performed by: INTERNAL MEDICINE

## 2025-02-12 PROCEDURE — 2720000007 HC OR 272 NO HCPCS

## 2025-02-12 RX ORDER — LIDOCAINE HYDROCHLORIDE 20 MG/ML
INJECTION, SOLUTION EPIDURAL; INFILTRATION; INTRACAUDAL; PERINEURAL AS NEEDED
Status: DISCONTINUED | OUTPATIENT
Start: 2025-02-12 | End: 2025-02-12

## 2025-02-12 RX ORDER — DICLOFENAC SODIUM 75 MG/1
75 TABLET, DELAYED RELEASE ORAL 2 TIMES DAILY
COMMUNITY
Start: 2025-01-06

## 2025-02-12 RX ORDER — PROPOFOL 10 MG/ML
INJECTION, EMULSION INTRAVENOUS AS NEEDED
Status: DISCONTINUED | OUTPATIENT
Start: 2025-02-12 | End: 2025-02-12

## 2025-02-12 RX ORDER — SODIUM CHLORIDE 0.9 % (FLUSH) 0.9 %
SYRINGE (ML) INJECTION AS NEEDED
Status: DISCONTINUED | OUTPATIENT
Start: 2025-02-12 | End: 2025-02-12

## 2025-02-12 RX ADMIN — LIDOCAINE HYDROCHLORIDE 40 MG: 20 INJECTION, SOLUTION EPIDURAL; INFILTRATION; INTRACAUDAL; PERINEURAL at 12:29

## 2025-02-12 RX ADMIN — PROPOFOL 50 MG: 10 INJECTION, EMULSION INTRAVENOUS at 12:33

## 2025-02-12 RX ADMIN — PROPOFOL 50 MG: 10 INJECTION, EMULSION INTRAVENOUS at 12:40

## 2025-02-12 RX ADMIN — Medication 2 ML: at 12:29

## 2025-02-12 RX ADMIN — PROPOFOL 50 MG: 10 INJECTION, EMULSION INTRAVENOUS at 12:31

## 2025-02-12 RX ADMIN — PROPOFOL 100 MG: 10 INJECTION, EMULSION INTRAVENOUS at 12:29

## 2025-02-12 RX ADMIN — Medication 5 ML: at 12:49

## 2025-02-12 RX ADMIN — PROPOFOL 50 MG: 10 INJECTION, EMULSION INTRAVENOUS at 12:36

## 2025-02-12 RX ADMIN — PROPOFOL 50 MG: 10 INJECTION, EMULSION INTRAVENOUS at 12:46

## 2025-02-12 RX ADMIN — PROPOFOL 50 MG: 10 INJECTION, EMULSION INTRAVENOUS at 12:43

## 2025-02-12 SDOH — HEALTH STABILITY: MENTAL HEALTH: CURRENT SMOKER: 0

## 2025-02-12 ASSESSMENT — PAIN - FUNCTIONAL ASSESSMENT
PAIN_FUNCTIONAL_ASSESSMENT: 0-10

## 2025-02-12 ASSESSMENT — PAIN SCALES - GENERAL
PAINLEVEL_OUTOF10: 0 - NO PAIN
PAIN_LEVEL: 0

## 2025-02-12 NOTE — ANESTHESIA POSTPROCEDURE EVALUATION
Patient: Sher Cameron    Procedure Summary       Date: 02/12/25 Room / Location: [facility]    Anesthesia Start: 1227 Anesthesia Stop: 1257    Procedure: COLONOSCOPY Diagnosis:       Positive colorectal cancer screening using Cologuard test      Encounter for screening colonoscopy    Scheduled Providers: Warner Piedra MD Responsible Provider: KHARI Farias    Anesthesia Type: MAC ASA Status: 2            Anesthesia Type: MAC    Vitals Value Taken Time   /86 02/12/25 1314   Temp 36.5 °C (97.7 °F) 02/12/25 1314   Pulse 71 02/12/25 1314   Resp 16 02/12/25 1314   SpO2 98 % 02/12/25 1314       Anesthesia Post Evaluation    Patient location during evaluation: bedside  Patient participation: complete - patient participated  Level of consciousness: awake and alert  Pain score: 0  Pain management: adequate  Airway patency: patent  Cardiovascular status: acceptable and hemodynamically stable  Respiratory status: acceptable  Hydration status: acceptable  Postoperative Nausea and Vomiting: none        There were no known notable events for this encounter.

## 2025-02-12 NOTE — ANESTHESIA PREPROCEDURE EVALUATION
Patient: Sher Cameron    Procedure Information       Date/Time: 02/12/25 1230    Scheduled providers: Warner Piedra MD    Procedure: COLONOSCOPY    Location:  [facility]            Relevant Problems   Anesthesia (within normal limits)      Cardiac   (+) Hyperlipidemia   (+) Hypertension      Neuro   (+) Carpal tunnel syndrome of left wrist   (+) Carpal tunnel syndrome of right wrist   (+) Major depressive disorder, single episode, moderate (Multi)      Endocrine   (+) Hypothyroidism      Musculoskeletal   (+) Carpal tunnel syndrome of left wrist   (+) Carpal tunnel syndrome of right wrist   (+) Osteoarthritis of finger of left hand   (+) Osteoarthritis of finger of right hand   (+) Primary localized osteoarthritis of left knee      Skin   (+) Eczema       Clinical information reviewed:   Tobacco  Allergies  Meds   Med Hx  Surg Hx   Fam Hx  Soc Hx        NPO Detail:  NPO/Void Status  Date of Last Liquid: 02/12/25  Time of Last Liquid: 0700  Date of Last Solid: 02/10/25  Last Intake Type: Clear fluids         Physical Exam    Airway  Mallampati: II  TM distance: >3 FB  Neck ROM: full     Cardiovascular - normal exam  Rhythm: regular  Rate: normal     Dental   (+) upper dentures     Pulmonary - normal exam     Abdominal - normal exam             Anesthesia Plan    History of general anesthesia?: yes  History of complications of general anesthesia?: no    ASA 2     MAC     The patient is not a current smoker.    intravenous induction   Anesthetic plan and risks discussed with patient.

## 2025-02-12 NOTE — H&P
Pre-sedation Evaluation:  Sedation Necessary For: Analgesia  Sedation to be Managed By: Anesthesia (Monitored Anesthesia Care/MAC)    History of Present Illness and Indication for Procedure      Sher Cameron is a 69 y.o. male with a history of HTN, hypothyroidism, prediabetes, depression, HLD, OA, and TB who presents for colonoscopy to evaluate a positive Cologuard.     He previously had a colonoscopy in 2019 that showed three descending polyps (TA on path) and diverticulosis. Recently his PCP ordered Cologuard which came back positive.    There is no family history of colorectal cancer.        NPO guidelines met: Yes         Review of Systems  Constitutional:  Negative for chills, fever and unexpected weight change.   HENT:  Negative for trouble swallowing.    Respiratory:  Negative for shortness of breath.    Cardiovascular:  Negative for chest pain.   Gastrointestinal:  As above.   Skin:  Negative for color change.       I performed a complete 10 point review of systems and it is negative except as noted in HPI or above. All other systems have been reviewed and are negative.      Patient Active Problem List   Diagnosis    Allergic rhinitis    Carpal tunnel syndrome of left wrist    Carpal tunnel syndrome of right wrist    Colon polyps    Major depressive disorder, single episode, moderate (Multi)    Diverticulosis    Eczema    Ganglion cyst of finger    Hand arthritis    History of tuberculosis    Hyperlipidemia    Hypertension    Hypothyroidism    Osteoarthritis of finger of left hand    Osteoarthritis of finger of right hand    Periorbital hyperpigmentation    Prediabetes    Vitamin D deficiency    Vitreous floaters    Medicare annual wellness visit, subsequent    Umbilical hernia without obstruction and without gangrene    Screening for prostate cancer    Encounter for immunization    Medicare annual wellness visit, initial    Annual physical exam    Trigger ring finger of left hand    Trigger ring  finger of right hand    Primary localized osteoarthritis of left knee    Impaired range of motion of left knee    Positive colorectal cancer screening using Cologuard test    Overweight with body mass index (BMI) 25.0-29.9       Past Medical History:  He has a past medical history of Arthritis, Depression, Diverticulosis, Hyperlipidemia, Hypertension, and Tuberculosis.    Past Surgical History:  He has a past surgical history that includes Eye surgery; Appendectomy; and Esophagus surgery.      Social History:  He reports that he has never smoked. He has been exposed to tobacco smoke. He has never used smokeless tobacco. He reports that he does not drink alcohol and does not use drugs.    Family History:  Family History   Problem Relation Name Age of Onset    Cancer Mother      Heart disease Father          Allergies:  Patient has no known allergies.    Current Medications  Current Outpatient Medications on File Prior to Encounter   Medication Sig Dispense Refill    atorvastatin (Lipitor) 40 mg tablet Take 1 tablet (40 mg) by mouth once daily at bedtime. 90 tablet 3    cholecalciferol (Vitamin D-3) 50 MCG (2000 UT) tablet Take 1 tablet (2,000 Units) by mouth once daily.      diclofenac (Voltaren) 75 mg EC tablet Take 1 tablet (75 mg) by mouth 2 times a day.      levothyroxine (Synthroid, Levoxyl) 150 mcg tablet Take 1 tablet (150 mcg) by mouth once daily. 90 tablet 3    lisinopril 20 mg tablet Take 1 tablet (20 mg) by mouth once daily. 90 tablet 3    sertraline (Zoloft) 100 mg tablet Take 1 tablet (100 mg) by mouth once daily.      sertraline (Zoloft) 50 mg tablet Take 1 tablet (50 mg) by mouth once daily. 90 tablet 3    triamcinolone (Kenalog) 0.1 % cream Apply topically 2 times a day. Apply to affected area 1-2 times daily as needed. (Patient not taking: Reported on 2/12/2025) 80 g 0     No current facility-administered medications on file prior to encounter.         Last Recorded Vitals  Blood pressure 133/87,  "pulse 76, temperature 36.4 °C (97.6 °F), temperature source Temporal, resp. rate 16, height 1.778 m (5' 10\"), weight 82.6 kg (182 lb), SpO2 100%.      Physical Exam  Vitals reviewed.   Constitutional:       General: He is not in acute distress.     Appearance: He is not ill-appearing.   HENT:      Head: Normocephalic and atraumatic.      Mouth/Throat:      Comments: Mallampati: II  Cardiovascular:      Rate and Rhythm: Normal rate and regular rhythm.      Pulses: Normal pulses.      Heart sounds: Normal heart sounds. No murmur heard.  Pulmonary:      Effort: Pulmonary effort is normal. No respiratory distress.   Abdominal:      General: Bowel sounds are normal.      Palpations: Abdomen is soft.      Tenderness: There is no abdominal tenderness.   Skin:     General: Skin is warm and dry.   Neurological:      General: No focal deficit present.      Mental Status: He is alert and oriented to person, place, and time.              Assessment/Plan     Colonoscopy in endo with MAC sedation, ASA 2        Level of Sedation: Moderate Sedation  (Sedation medications to be delivered via monitored anesthesia care (MAC).     This evaluation serves as my H&P.     Outpatient medication list and allergies have been reviewed.  Pre-procedure/garcia procedure antibiotics not needed.     Pre-procedure evaluation completed by physician.           Warner Piedra MD          "

## 2025-02-13 NOTE — ADDENDUM NOTE
Encounter addended by: Lakeshia Navarro RN on: 2/13/2025 11:25 AM   Actions taken: Contacts section saved, Flowsheet accepted

## 2025-02-15 DIAGNOSIS — I10 PRIMARY HYPERTENSION: ICD-10-CM

## 2025-02-17 ENCOUNTER — TELEPHONE (OUTPATIENT)
Dept: PRIMARY CARE | Facility: CLINIC | Age: 70
End: 2025-02-17
Payer: COMMERCIAL

## 2025-02-17 DIAGNOSIS — E03.8 OTHER SPECIFIED HYPOTHYROIDISM: ICD-10-CM

## 2025-02-17 DIAGNOSIS — I10 PRIMARY HYPERTENSION: ICD-10-CM

## 2025-02-17 DIAGNOSIS — E78.49 OTHER HYPERLIPIDEMIA: ICD-10-CM

## 2025-02-17 DIAGNOSIS — F32.1 DEPRESSION, MAJOR, SINGLE EPISODE, MODERATE (MULTI): ICD-10-CM

## 2025-02-17 LAB
LABORATORY COMMENT REPORT: NORMAL
PATH REPORT.FINAL DX SPEC: NORMAL
PATH REPORT.GROSS SPEC: NORMAL
PATH REPORT.RELEVANT HX SPEC: NORMAL
PATH REPORT.TOTAL CANCER: NORMAL
RESIDENT REVIEW: NORMAL

## 2025-02-17 RX ORDER — LISINOPRIL 20 MG/1
20 TABLET ORAL DAILY
Qty: 90 TABLET | Refills: 1 | Status: SHIPPED | OUTPATIENT
Start: 2025-02-17 | End: 2026-02-17

## 2025-02-17 RX ORDER — SERTRALINE HYDROCHLORIDE 50 MG/1
50 TABLET, FILM COATED ORAL DAILY
Qty: 90 TABLET | Refills: 1 | Status: SHIPPED | OUTPATIENT
Start: 2025-02-17

## 2025-02-17 RX ORDER — ATORVASTATIN CALCIUM 40 MG/1
40 TABLET, FILM COATED ORAL NIGHTLY
Qty: 90 TABLET | Refills: 1 | Status: SHIPPED | OUTPATIENT
Start: 2025-02-17

## 2025-02-17 RX ORDER — LISINOPRIL 20 MG/1
20 TABLET ORAL DAILY
Qty: 90 TABLET | Refills: 0 | OUTPATIENT
Start: 2025-02-17

## 2025-02-17 RX ORDER — LEVOTHYROXINE SODIUM 150 UG/1
150 TABLET ORAL DAILY
Qty: 90 TABLET | Refills: 1 | Status: SHIPPED | OUTPATIENT
Start: 2025-02-17 | End: 2026-02-12

## 2025-02-17 NOTE — TELEPHONE ENCOUNTER
Patient is requesting a refill on:    lisinopril 20 mg tablet   Route: Take 1 tablet (20 mg) by mouth once daily.      [facility]     Last office visit: 10/31/2024    Next office visit: 2/27/2025

## 2025-02-18 ENCOUNTER — OFFICE VISIT (OUTPATIENT)
Dept: ORTHOPEDIC SURGERY | Facility: HOSPITAL | Age: 70
End: 2025-02-18
Payer: COMMERCIAL

## 2025-02-18 VITALS — WEIGHT: 182 LBS | HEIGHT: 70 IN | BODY MASS INDEX: 26.05 KG/M2

## 2025-02-18 DIAGNOSIS — M17.12 PRIMARY OSTEOARTHRITIS OF LEFT KNEE: Primary | ICD-10-CM

## 2025-02-18 PROCEDURE — 3008F BODY MASS INDEX DOCD: CPT | Performed by: SPECIALIST

## 2025-02-18 PROCEDURE — 99214 OFFICE O/P EST MOD 30 MIN: CPT | Performed by: SPECIALIST

## 2025-02-18 PROCEDURE — 1036F TOBACCO NON-USER: CPT | Performed by: SPECIALIST

## 2025-02-18 PROCEDURE — 1123F ACP DISCUSS/DSCN MKR DOCD: CPT | Performed by: SPECIALIST

## 2025-02-18 PROCEDURE — 1159F MED LIST DOCD IN RCRD: CPT | Performed by: SPECIALIST

## 2025-02-18 NOTE — PROGRESS NOTES
Follow Up Left Knee Pain   Last injected 1/7/2025  Patient would like to discuss Surgery had no relief from the last injection     MUSCULOSKELETAL:  Laterality: Left knee Exam  - Alignment: partially correctible varus deformity  - ROM: Full with mild crepitus and pain  - Effusion: none  - Strength: knee extension and flexion 5/5, EHL/PF/DF motor intact  - Palpation: TTP mostly along medial joint line  - Stability: Anterior/Posterior stable, varus/valgus stable. Mild pseudo valgus instability  - Gait: mild antalgic  - Hip Exam: flexion to 100+ degrees, full extension, internal/external rotation adequate, and no pain with log roll  - Special Tests: none performed     Assessment plan: Patient with moderate to severe left knee osteoarthritis failing all conservative treatments.  It is now affecting daily activities and sleep.  He is considering proceeding with a left total knee arthroplasty.  Likely will consider surgery in the spring.  I told him to call us at least 6 to 8 weeks prior to the surgical date he picks.  He will need to come in for a preop paperwork.

## 2025-02-26 LAB
ALBUMIN SERPL-MCNC: 4.2 G/DL (ref 3.6–5.1)
ALP SERPL-CCNC: 39 U/L (ref 35–144)
ALT SERPL-CCNC: 33 U/L (ref 9–46)
ANION GAP SERPL CALCULATED.4IONS-SCNC: 5 MMOL/L (CALC) (ref 7–17)
AST SERPL-CCNC: 24 U/L (ref 10–35)
BILIRUB SERPL-MCNC: 0.6 MG/DL (ref 0.2–1.2)
BUN SERPL-MCNC: 16 MG/DL (ref 7–25)
CALCIUM SERPL-MCNC: 9.2 MG/DL (ref 8.6–10.3)
CHLORIDE SERPL-SCNC: 104 MMOL/L (ref 98–110)
CHOLEST SERPL-MCNC: 171 MG/DL
CHOLEST/HDLC SERPL: 2.9 (CALC)
CO2 SERPL-SCNC: 31 MMOL/L (ref 20–32)
CREAT SERPL-MCNC: 0.91 MG/DL (ref 0.7–1.28)
EGFRCR SERPLBLD CKD-EPI 2021: 91 ML/MIN/1.73M2
EST. AVERAGE GLUCOSE BLD GHB EST-MCNC: 126 MG/DL
EST. AVERAGE GLUCOSE BLD GHB EST-SCNC: 7 MMOL/L
GLUCOSE SERPL-MCNC: 98 MG/DL (ref 65–99)
HBA1C MFR BLD: 6 % OF TOTAL HGB
HDLC SERPL-MCNC: 59 MG/DL
LDLC SERPL CALC-MCNC: 96 MG/DL (CALC)
NONHDLC SERPL-MCNC: 112 MG/DL (CALC)
POTASSIUM SERPL-SCNC: 4.5 MMOL/L (ref 3.5–5.3)
PROT SERPL-MCNC: 6.5 G/DL (ref 6.1–8.1)
SODIUM SERPL-SCNC: 140 MMOL/L (ref 135–146)
TRIGL SERPL-MCNC: 74 MG/DL
TSH SERPL-ACNC: 4.06 MIU/L (ref 0.4–4.5)

## 2025-02-27 ENCOUNTER — APPOINTMENT (OUTPATIENT)
Dept: PRIMARY CARE | Facility: CLINIC | Age: 70
End: 2025-02-27
Payer: COMMERCIAL

## 2025-02-27 VITALS
SYSTOLIC BLOOD PRESSURE: 117 MMHG | TEMPERATURE: 97.5 F | WEIGHT: 177 LBS | RESPIRATION RATE: 14 BRPM | HEART RATE: 74 BPM | DIASTOLIC BLOOD PRESSURE: 77 MMHG | OXYGEN SATURATION: 98 % | BODY MASS INDEX: 25.4 KG/M2

## 2025-02-27 DIAGNOSIS — F32.1 DEPRESSION, MAJOR, SINGLE EPISODE, MODERATE (MULTI): ICD-10-CM

## 2025-02-27 DIAGNOSIS — I10 PRIMARY HYPERTENSION: ICD-10-CM

## 2025-02-27 DIAGNOSIS — F32.1 MAJOR DEPRESSIVE DISORDER, SINGLE EPISODE, MODERATE (MULTI): Primary | ICD-10-CM

## 2025-02-27 DIAGNOSIS — E03.8 OTHER SPECIFIED HYPOTHYROIDISM: ICD-10-CM

## 2025-02-27 DIAGNOSIS — Z12.5 SCREENING FOR PROSTATE CANCER: ICD-10-CM

## 2025-02-27 DIAGNOSIS — E55.9 VITAMIN D DEFICIENCY: ICD-10-CM

## 2025-02-27 DIAGNOSIS — E78.49 OTHER HYPERLIPIDEMIA: ICD-10-CM

## 2025-02-27 DIAGNOSIS — M19.049 HAND ARTHRITIS: ICD-10-CM

## 2025-02-27 DIAGNOSIS — R73.03 PREDIABETES: ICD-10-CM

## 2025-02-27 PROCEDURE — 1160F RVW MEDS BY RX/DR IN RCRD: CPT | Performed by: FAMILY MEDICINE

## 2025-02-27 PROCEDURE — 1036F TOBACCO NON-USER: CPT | Performed by: FAMILY MEDICINE

## 2025-02-27 PROCEDURE — 3074F SYST BP LT 130 MM HG: CPT | Performed by: FAMILY MEDICINE

## 2025-02-27 PROCEDURE — 1158F ADVNC CARE PLAN TLK DOCD: CPT | Performed by: FAMILY MEDICINE

## 2025-02-27 PROCEDURE — 3078F DIAST BP <80 MM HG: CPT | Performed by: FAMILY MEDICINE

## 2025-02-27 PROCEDURE — 1159F MED LIST DOCD IN RCRD: CPT | Performed by: FAMILY MEDICINE

## 2025-02-27 PROCEDURE — 1123F ACP DISCUSS/DSCN MKR DOCD: CPT | Performed by: FAMILY MEDICINE

## 2025-02-27 PROCEDURE — 99214 OFFICE O/P EST MOD 30 MIN: CPT | Performed by: FAMILY MEDICINE

## 2025-02-27 PROCEDURE — G2211 COMPLEX E/M VISIT ADD ON: HCPCS | Performed by: FAMILY MEDICINE

## 2025-02-27 RX ORDER — LISINOPRIL 20 MG/1
20 TABLET ORAL DAILY
Qty: 90 TABLET | Refills: 1 | Status: SHIPPED | OUTPATIENT
Start: 2025-02-27 | End: 2026-02-27

## 2025-02-27 RX ORDER — ATORVASTATIN CALCIUM 40 MG/1
40 TABLET, FILM COATED ORAL NIGHTLY
Qty: 90 TABLET | Refills: 1 | Status: SHIPPED | OUTPATIENT
Start: 2025-02-27

## 2025-02-27 RX ORDER — ACETAMINOPHEN 500 MG
2000 TABLET ORAL DAILY
COMMUNITY
Start: 2025-01-28

## 2025-02-27 RX ORDER — LEVOTHYROXINE SODIUM 150 UG/1
150 TABLET ORAL DAILY
Qty: 90 TABLET | Refills: 1 | Status: SHIPPED | OUTPATIENT
Start: 2025-02-27 | End: 2026-02-22

## 2025-02-27 ASSESSMENT — PATIENT HEALTH QUESTIONNAIRE - PHQ9
1. LITTLE INTEREST OR PLEASURE IN DOING THINGS: NOT AT ALL
SUM OF ALL RESPONSES TO PHQ9 QUESTIONS 1 AND 2: 0
2. FEELING DOWN, DEPRESSED OR HOPELESS: NOT AT ALL

## 2025-02-27 ASSESSMENT — ENCOUNTER SYMPTOMS
ARTHRALGIAS: 1
FEVER: 0
SHORTNESS OF BREATH: 0
CHILLS: 0
ABDOMINAL PAIN: 0
CHEST TIGHTNESS: 0
PALPITATIONS: 0
CONFUSION: 0

## 2025-02-27 NOTE — PROGRESS NOTES
Subjective   Patient ID: Sher Cameron is a 69 y.o. male who presents for Follow-up (4 month).    HPI patient today for follow-up of ongoing healthcare issues and review of lab work overalls been feeling good.  He saw orthopedics had injections in his hands for his arthritis is doing much better has colonoscopy completed they snared out a few polyps he has a follow-up scope in 3 years.  Also having some arthritic issues in his knees says they may need to do a knee replacement but he is try to put that off for the time being.  Says the Voltaren does give him adequate relief.    Review of Systems   Constitutional:  Negative for chills and fever.   HENT:  Negative for congestion and ear pain.    Eyes:  Negative for visual disturbance.   Respiratory:  Negative for chest tightness and shortness of breath.    Cardiovascular:  Negative for chest pain and palpitations.   Gastrointestinal:  Negative for abdominal pain.   Musculoskeletal:  Positive for arthralgias.   Skin:  Negative for pallor.   Psychiatric/Behavioral:  Negative for confusion.        Objective   /77   Pulse 74   Temp 36.4 °C (97.5 °F)   Resp 14   Wt 80.3 kg (177 lb)   SpO2 98%   BMI 25.40 kg/m²     Physical Exam  Vitals and nursing note reviewed.   Constitutional:       General: He is not in acute distress.     Appearance: Normal appearance. He is not ill-appearing.   HENT:      Head: Normocephalic and atraumatic.      Right Ear: Tympanic membrane, ear canal and external ear normal.      Left Ear: Tympanic membrane, ear canal and external ear normal.      Mouth/Throat:      Pharynx: Oropharynx is clear.   Eyes:      Extraocular Movements: Extraocular movements intact.   Cardiovascular:      Rate and Rhythm: Normal rate and regular rhythm.      Pulses: Normal pulses.      Heart sounds: Normal heart sounds.   Pulmonary:      Effort: Pulmonary effort is normal.      Breath sounds: Normal breath sounds.   Abdominal:      General: Abdomen is flat.  Bowel sounds are normal.      Palpations: Abdomen is soft.      Tenderness: There is no abdominal tenderness.   Musculoskeletal:         General: Normal range of motion.      Cervical back: Neck supple.   Skin:     General: Skin is warm.   Neurological:      Mental Status: He is alert and oriented to person, place, and time. Mental status is at baseline.   Psychiatric:         Mood and Affect: Mood normal.       Recent Results (from the past 6 weeks)   Surgical Pathology Exam    Collection Time: 02/12/25 12:40 PM   Result Value Ref Range    Case Report       Surgical Pathology                                Case: [id number]                                  Authorizing Provider:  Warner Piedra MD        Collected:           02/12/2025 1240              Ordering Location:      [facility]    Received:            02/12/2025 1443                                     [facility]                                                                     Pathologist:           Kiara Schaefer MD                                                            Specimens:   A) - COLON - HEPATIC FLEXURE POLYP                                                                  B) - COLON - SIGMOID POLYP                                                                          C) - RECTUM POLYPECTOMY, rectum polyp x2                                                   FINAL DIAGNOSIS       A. Colon, hepatic flexure, polypectomy:   - Tubular adenoma.     B. Colon, sigmoid, polypectomy:   - Hyperplastic polyp with prolapse changes.    C. Rectum, x2, polypectomy:   - Tubular adenoma.  - Hyperplastic polyp with prolapse changes.                 By the signature on this report, the individual or group listed as making the Final Interpretation/Diagnosis certifies that they have reviewed this case.       Resident Review       The gross and/or microscopic findings were reviewed in conjunction with pathology fellow, Tony Flores MD.         Clinical History       R19.5 - Positive colorectal cancer screening using Cologuard test [ICD-10-CM] Z12.11 - Encounter for screening colonoscopy [ICD-10-CM]      Gross Description       A: Received in formalin, labeled with the patient's name and hospital number, is 1 fragment of tan, soft tissue measuring 0.2 x 0.2 x 0.2 cm. The specimen is submitted in toto in one cassette.  JEK   B: Received in formalin, labeled with the patient's name and hospital number, is 1 fragment of tan, soft tissue measuring 0.3 x 0.2 x 0.2 cm. The specimen is submitted in toto in one cassette.  JEK   C: Received in formalin, labeled with the patient's name and hospital number, are 2 fragments of tan, soft tissue aggregating to 0.7 x 0.3 x 0.2 cm. The specimen is submitted in toto in one cassette.  YOSELYN      Lipid Panel    Collection Time: 02/25/25 10:11 AM   Result Value Ref Range    CHOLESTEROL, TOTAL 171 <200 mg/dL    HDL CHOLESTEROL 59 > OR = 40 mg/dL    TRIGLYCERIDES 74 <150 mg/dL    LDL-CHOLESTEROL 96 mg/dL (calc)    CHOL/HDLC RATIO 2.9 <5.0 (calc)    NON HDL CHOLESTEROL 112 <130 mg/dL (calc)   Comprehensive Metabolic Panel    Collection Time: 02/25/25 10:11 AM   Result Value Ref Range    GLUCOSE 98 65 - 99 mg/dL    UREA NITROGEN (BUN) 16 7 - 25 mg/dL    CREATININE 0.91 0.70 - 1.28 mg/dL    EGFR 91 > OR = 60 mL/min/1.73m2    SODIUM 140 135 - 146 mmol/L    POTASSIUM 4.5 3.5 - 5.3 mmol/L    CHLORIDE 104 98 - 110 mmol/L    CARBON DIOXIDE 31 20 - 32 mmol/L    ELECTROLYTE BALANCE 5 (L) 7 - 17 mmol/L (calc)    CALCIUM 9.2 8.6 - 10.3 mg/dL    PROTEIN, TOTAL 6.5 6.1 - 8.1 g/dL    ALBUMIN 4.2 3.6 - 5.1 g/dL    BILIRUBIN, TOTAL 0.6 0.2 - 1.2 mg/dL    ALKALINE PHOSPHATASE 39 35 - 144 U/L    AST 24 10 - 35 U/L    ALT 33 9 - 46 U/L   TSH with reflex to Free T4 if abnormal    Collection Time: 02/25/25 10:11 AM   Result Value Ref Range    TSH W/REFLEX TO FT4 4.06 0.40 - 4.50 mIU/L   Hemoglobin A1C    Collection Time: 02/25/25 10:11 AM   Result  Value Ref Range    HEMOGLOBIN A1c 6.0 (H) <5.7 % of total Hgb    eAG (mg/dL) 126 mg/dL    eAG (mmol/L) 7.0 mmol/L     Recent labs reviewed overall numbers are stable for patient continue current medications along with low-fat low-cholesterol low sugar diet.    Continue to follow with specialist as needed    Return to our office 4 months with repeat fasting labs    Assessment/Plan   Problem List Items Addressed This Visit             ICD-10-CM    Major depressive disorder, single episode, moderate (Multi) - Primary F32.1     Stable continue sertraline  daily         Relevant Orders    Follow Up In Primary Care - Established    Hand arthritis M19.049     Improved with injections from orthopedics continues to also use the Voltaren with good results         Hyperlipidemia E78.5     Continue atorvastatin 40 mg daily         Relevant Medications    atorvastatin (Lipitor) 40 mg tablet    Other Relevant Orders    Comprehensive Metabolic Panel    Lipid Panel    Follow Up In Primary Care - Established    Hypertension I10     Stable continue current medication         Relevant Medications    lisinopril 20 mg tablet    Other Relevant Orders    CBC    Comprehensive Metabolic Panel    Follow Up In Primary Care - Established    Hypothyroidism E03.9     Continue levothyroxine 150 mcg daily monitor TSH with reflex         Relevant Medications    levothyroxine (Synthroid, Levoxyl) 150 mcg tablet    Other Relevant Orders    Comprehensive Metabolic Panel    TSH with reflex to Free T4 if abnormal    Follow Up In Primary Care - Established    Prediabetes R73.03     A1c 6.0% continue work on dietary modifications         Relevant Orders    CBC    Comprehensive Metabolic Panel    Hemoglobin A1C    Follow Up In Primary Care - Established    Vitamin D deficiency E55.9     Continue to monitor and supplement as needed         Relevant Orders    Vitamin D 25-Hydroxy,Total (for eval of Vitamin D levels)    Screening for prostate cancer Z12.5      screening PSA         Relevant Orders    Prostate Specific Antigen, Screen     Other Visit Diagnoses         Codes    Depression, major, single episode, moderate (Multi)     F32.1    Relevant Orders    Follow Up In Primary Care - Established

## 2025-03-10 NOTE — ASSESSMENT & PLAN NOTE
NEPHROLOGY HOSPITAL PROGRESS NOTE   Devin Chaves 76 y.o. male MRN: [id number]  Unit/Bed#: S -01 Encounter: [id number]  Reason for Consult: ORIANA    Assessment & Plan  ORIANA (acute kidney injury) (HCC)  Baseline creatinine is normal at 0.8 to 0.9.   Admission SCr 2.43 on 2/28/25.   SCr down to 1.80 on 3/7/25.   SCr is up to 2.03 today.   Etiology is not entirely clear - working dx is ATN but could have another underlying pathology.   CT 2/28/25 - no hydronephrosis. UA on 3/8/25 with RBC 2-4, WBC 2-4, small blood and trace protein.   Of note, UPC ratio was 3.4 gm on 3/8/25 and urine ACR was only 821 mg on 3/5/25.   Follow up repeat SPEP, UPEP,  KL ratio.   Appears fluid overloaded but not in respiratory failure.   Increase Torsemide to 10 mg OD.     HTN (hypertension)  BP is at goal.   Rx: Metoprolol 25 mg BID, Torsemide 10 mg QOD.   Increase Torsemide to 10 mg OD.     MSSA bacteremia  ID following.   Currently on Cefazolin   Possible cutaneous source    Urinary retention  Seen by urology this admission.   Currently on Tamsulosin.     Symptomatic anemia  Hgb monitoring per primary service.   Iron studies okay in Feb 2025.   Transfuse as needed.     Colonic mass  Newly diagnosed colon mass on C-scope on 2/20/25.   Pathology - adenocarcinoma  Was to follow colorectal as outpatient      TODAY's DISCUSSION / PLAN:  Creatinine up to 2.03 today - this is acceptable.   Increase Torsemide to 10 mg daily.   No need for IV diuresis from my standpoint.   Trend SCr.     SUBJECTIVE / 24H INTERVAL HISTORY:  Denies any CP or SOB.     OBJECTIVE:  Current Weight: Weight - Scale: 89.3 kg (196 lb 13.9 oz)  Vitals:    03/09/25 2252 03/10/25 0600 03/10/25 0733 03/10/25 1522   BP: 125/58  125/56 123/99   Pulse: 79  71 80   Resp:   16 16   Temp: 98.5 °F (36.9 °C)  98.5 °F (36.9 °C) 97.9 °F (36.6 °C)   TempSrc:       SpO2: 100%  95% 100%   Weight:  89.3 kg (196 lb 13.9 oz)     Height:           Intake/Output Summary (Last 24 hours) at  TSH still slightly decrease decrease levothyroxine to 150 mcg daily   3/10/2025 1744  Last data filed at 3/10/2025 0900  Gross per 24 hour   Intake 120 ml   Output --   Net 120 ml     General: conscious, cooperative, no distress  Skin: dry  Eyes: pink conjunctivae  ENT: moist mucous membranes  Respiratory: equal chest expansion, coarse breath sounds.  Cardiovascular: distinct heart sounds, normal rate, regular rhythm, no rub  Abdomen: soft, non tender, non distended, normal bowel sounds  Extremities: mild LE edema.   Genitourinary: no glass catheter.   Neuro: awake, alert.   Psych: appropriate affect    Medications:    Current Facility-Administered Medications:     acetaminophen (TYLENOL) tablet 650 mg, 650 mg, Oral, Q6H PRN, Jean Brady MD    albuterol (PROVENTIL HFA,VENTOLIN HFA) inhaler 2 puff, 2 puff, Inhalation, Q4H PRN, Fiordaliza Ibrahim MD    apixaban (ELIQUIS) tablet 5 mg, 5 mg, Oral, BID, Kelly Herman MD, 5 mg at 03/10/25 1740    ascorbic acid (VITAMIN C) tablet 250 mg, 250 mg, Oral, Daily, Kanchan Wilson MD, 250 mg at 03/10/25 0850    atorvastatin (LIPITOR) tablet 40 mg, 40 mg, Oral, Daily With Dinner, Kanchan Wilson MD, 40 mg at 03/10/25 1740    ceFAZolin (ANCEF) IVPB (premix in dextrose) 2,000 mg 50 mL, 2,000 mg, Intravenous, Q8H, Olivia Martinez MD, Last Rate: 100 mL/hr at 03/10/25 1149, 2,000 mg at 03/10/25 1149    Cholecalciferol (VITAMIN D3) tablet 2,000 Units, 2,000 Units, Oral, Daily, Kanchan Wilson MD, 2,000 Units at 03/10/25 0849    cyanocobalamin (VITAMIN B-12) tablet 100 mcg, 100 mcg, Oral, Daily, Kanchan Wilson MD, 100 mcg at 03/10/25 0849    fluticasone (ARNUITY ELLIPTA) 100 MCG/ACT inhaler 1 puff, 1 puff, Inhalation, Daily, Kanchan Wilson MD, 1 puff at 03/10/25 0850    folic acid (FOLVITE) tablet 1 mg, 1 mg, Oral, Daily, Kanchan Wilson MD, 1 mg at 03/10/25 0850    HYDROmorphone HCl (DILAUDID) injection 0.2 mg, 0.2 mg, Intravenous, Q4H PRN, Jean Brady MD    hydroxychloroquine (PLAQUENIL) tablet 400 mg, 400 mg, Oral,  Daily With Breakfast, Kanchan Wilson MD, 400 mg at 03/10/25 0849    lidocaine (LIDODERM) 5 % patch 3 patch, 3 patch, Topical, Daily, Kanchan Wilson MD, 3 patch at 03/10/25 0850    metoprolol tartrate (LOPRESSOR) tablet 25 mg, 25 mg, Oral, Q12H GALE, Kanchan Wilson MD, 25 mg at 03/10/25 0849    moisture barrier miconazole 2% cream (aka HITESH MOISTURE BARRIER ANTIFUNGAL CREAM), , Topical, BID, Mal Neely DO, Given at 03/10/25 1740    oxyCODONE (ROXICODONE) IR tablet 5 mg, 5 mg, Oral, Q6H PRN, Jean Brady MD, 5 mg at 03/01/25 1817    oxyCODONE (ROXICODONE) split tablet 2.5 mg, 2.5 mg, Oral, Q6H PRN, Jean Brady MD, 2.5 mg at 03/05/25 0945    pantoprazole (PROTONIX) EC tablet 40 mg, 40 mg, Oral, Daily Before Breakfast, Jean Brady MD, 40 mg at 03/10/25 0541    polyethylene glycol (MIRALAX) packet 17 g, 17 g, Oral, Daily PRN, Kelly Herman MD    senna-docusate sodium (SENOKOT S) 8.6-50 mg per tablet 1 tablet, 1 tablet, Oral, HS, Kelly Herman MD, 1 tablet at 03/09/25 2156    tamsulosin (FLOMAX) capsule 0.4 mg, 0.4 mg, Oral, Daily With Dinner, Kanchan Wilson MD, 0.4 mg at 03/10/25 1740    torsemide (DEMADEX) tablet 10 mg, 10 mg, Oral, Every Other Day, Saskia Roland MD, 10 mg at 03/10/25 0849    Laboratory Results:  Results from last 7 days   Lab Units 03/10/25  0828 03/10/25  0442 03/09/25  0544 03/08/25  0530 03/07/25  0541 03/06/25  0521 03/05/25 2032 03/05/25 0525 03/04/25 2034 03/04/25  0525   WBC Thousand/uL  --  4.92 5.18 4.62 5.12 4.63  --  5.28  --  4.96   HEMOGLOBIN g/dL 7.7* 7.0* 7.6* 7.8* 8.2* 7.7* 7.7* 8.0*   < > 8.2*   HEMATOCRIT % 25.5* 23.5* 24.5* 25.3* 26.4* 24.7* 24.5* 26.1*   < > 26.2*   PLATELETS Thousands/uL  --  133* 144* 160 159 159  --  187  --  173   POTASSIUM mmol/L  --  3.8 3.6 3.9 4.2 3.5  --  3.5  --  3.7   CHLORIDE mmol/L  --  109* 109* 110* 110* 109*  --  111*  --  110*   CO2 mmol/L  --  32 30 31 28 28  --  27  --  26   BUN  "mg/dL  --  36* 36* 35* 34* 38*  --  40*  --  41*   CREATININE mg/dL  --  2.03* 1.94* 1.97* 1.80* 1.86*  --  1.80*  --  1.85*   CALCIUM mg/dL  --  8.0* 8.1* 8.2* 8.2* 7.7*  --  7.9*  --  7.9*   MAGNESIUM mg/dL  --  2.1 1.9 2.0 1.8* 1.9  --  2.0  --  2.0    < > = values in this interval not displayed.     Portions of the record may have been created with voice recognition software. Occasional wrong word or \"sound a like\" substitutions may have occurred due to the inherent limitations of voice recognition software. Read the chart carefully and recognize, using context, where substitutions have occurred.If you have any questions, please contact the dictating provider.    "

## 2025-03-17 ENCOUNTER — TELEPHONE (OUTPATIENT)
Dept: PRIMARY CARE | Facility: CLINIC | Age: 70
End: 2025-03-17
Payer: COMMERCIAL

## 2025-03-17 DIAGNOSIS — M19.042 OSTEOARTHRITIS OF FINGER OF LEFT HAND: ICD-10-CM

## 2025-03-17 DIAGNOSIS — M19.041 OSTEOARTHRITIS OF FINGER OF RIGHT HAND: ICD-10-CM

## 2025-03-17 DIAGNOSIS — M17.12 PRIMARY LOCALIZED OSTEOARTHRITIS OF LEFT KNEE: Primary | ICD-10-CM

## 2025-03-17 RX ORDER — DICLOFENAC SODIUM 75 MG/1
75 TABLET, DELAYED RELEASE ORAL 2 TIMES DAILY
Qty: 60 TABLET | Refills: 5 | Status: SHIPPED | OUTPATIENT
Start: 2025-03-17 | End: 2025-09-13

## 2025-03-17 NOTE — TELEPHONE ENCOUNTER
Patient is requesting a refill on:    diclofenac (Voltaren) 75 mg EC tablet   Route: Take 1 tablet (75 mg) by mouth 2 times a day.     [facility]     Last office visit:  2/27/2025  Next office visit:  7/2/2025

## 2025-03-21 RX ORDER — DICLOFENAC SODIUM 75 MG/1
TABLET, DELAYED RELEASE ORAL
Qty: 60 TABLET | Refills: 0 | OUTPATIENT
Start: 2025-03-21

## 2025-05-27 DIAGNOSIS — R73.03 PREDIABETES: ICD-10-CM

## 2025-05-27 DIAGNOSIS — I10 PRIMARY HYPERTENSION: ICD-10-CM

## 2025-05-27 DIAGNOSIS — Z12.5 SCREENING FOR PROSTATE CANCER: ICD-10-CM

## 2025-05-27 DIAGNOSIS — E78.49 OTHER HYPERLIPIDEMIA: ICD-10-CM

## 2025-05-27 DIAGNOSIS — E03.8 OTHER SPECIFIED HYPOTHYROIDISM: ICD-10-CM

## 2025-05-27 DIAGNOSIS — E55.9 VITAMIN D DEFICIENCY: ICD-10-CM

## 2025-06-05 ENCOUNTER — OFFICE VISIT (OUTPATIENT)
Dept: ORTHOPEDIC SURGERY | Facility: HOSPITAL | Age: 70
End: 2025-06-05
Payer: COMMERCIAL

## 2025-06-05 VITALS — BODY MASS INDEX: 25.34 KG/M2 | HEIGHT: 70 IN | WEIGHT: 177 LBS

## 2025-06-05 DIAGNOSIS — M65.341 TRIGGER RING FINGER OF RIGHT HAND: Primary | ICD-10-CM

## 2025-06-05 DIAGNOSIS — M19.031 CMC DJD(CARPOMETACARPAL DEGENERATIVE JOINT DISEASE), LOCALIZED PRIMARY, RIGHT: ICD-10-CM

## 2025-06-05 DIAGNOSIS — M19.032 CMC DJD(CARPOMETACARPAL DEGENERATIVE JOINT DISEASE), LOCALIZED PRIMARY, LEFT: ICD-10-CM

## 2025-06-05 PROCEDURE — 20600 DRAIN/INJ JOINT/BURSA W/O US: CPT | Mod: 50 | Performed by: ORTHOPAEDIC SURGERY

## 2025-06-05 PROCEDURE — 2500000004 HC RX 250 GENERAL PHARMACY W/ HCPCS (ALT 636 FOR OP/ED): Performed by: ORTHOPAEDIC SURGERY

## 2025-06-05 PROCEDURE — 20550 NJX 1 TENDON SHEATH/LIGAMENT: CPT | Mod: RT | Performed by: ORTHOPAEDIC SURGERY

## 2025-06-05 PROCEDURE — 99212 OFFICE O/P EST SF 10 MIN: CPT | Performed by: ORTHOPAEDIC SURGERY

## 2025-06-05 RX ORDER — LIDOCAINE HYDROCHLORIDE 10 MG/ML
1 INJECTION, SOLUTION INFILTRATION; PERINEURAL
Status: COMPLETED | OUTPATIENT
Start: 2025-06-05 | End: 2025-06-05

## 2025-06-05 RX ORDER — TRIAMCINOLONE ACETONIDE 40 MG/ML
40 INJECTION, SUSPENSION INTRA-ARTICULAR; INTRAMUSCULAR
Status: COMPLETED | OUTPATIENT
Start: 2025-06-05 | End: 2025-06-05

## 2025-06-05 RX ADMIN — LIDOCAINE HYDROCHLORIDE 1 ML: 10 INJECTION, SOLUTION INFILTRATION; PERINEURAL at 11:43

## 2025-06-05 RX ADMIN — TRIAMCINOLONE ACETONIDE 40 MG: 40 INJECTION, SUSPENSION INTRA-ARTICULAR; INTRAMUSCULAR at 11:43

## 2025-06-05 RX ADMIN — TRIAMCINOLONE ACETONIDE 10 MG: 10 INJECTION, SUSPENSION INTRA-ARTICULAR; INTRALESIONAL at 11:43

## 2025-06-05 ASSESSMENT — ENCOUNTER SYMPTOMS
DEPRESSION: 0
LOSS OF SENSATION IN FEET: 0
OCCASIONAL FEELINGS OF UNSTEADINESS: 0

## 2025-06-05 NOTE — PROGRESS NOTES
69 y.o. male presents today for follow-up of bilateral base of thumb pain and right ring finger catching, clicking, locking, pain. The patient reports symptoms for months in the fingers, years in the thumbs. Pain is controlled. Patient reports no numbness and tingling.  Reports no previous surgeries or trauma to the area.  Reports pain worse with use, better at rest.   Pain dull ache, sharp at times.  States fingers will lock at times, worse at night.  Thumbs worse when opening bottles and jars.  Does a lot of woodworking and painting and can cause him pain.  He states he has had a Comfort Cool in the past which has helped some for his right.  Also takes diclofenac which helps some.  We did shots 4 months ago in thumbs which helped.  One shot in both ring fingers in the past.  Would like another in the right ring.    Review of Systems    Constitutional: see HPI, no fever, no chills, not feeling tired, no significant weight gain or weight loss.   Eyes: No vision changes  ENT: no nosebleeds.   Cardiovascular: no chest pain.   Respiratory: no shortness of breath and no cough.   Gastrointestinal: no abdominal pain, no nausea, no vomiting and no diarrhea.   Musculoskeletal: per HPI  Neurological: no headache, no gait disturbances  Psychiatric: no depression and no sleep disturbances.   Endocrine: no muscle weakness and no muscle cramps.   Hematologic/Lymphatic: no swollen glands and no tendency for easy bruising or excessive swelling.     Patient's past medical history, past surgical history, allergies, and medications have been reviewed unless otherwise noted in the chart.     CMC Arthritis Exam  Inspection:  no evidence of infection, no edema, no erythema, no ecchymosis, Palpation:  compartments are soft, pain with palpation over the Thumb CMC joint, Range of Motion:  full wrist and finger range of motion, Stability:  no wrist or finger instability detected, Strength:  5/5  and pinch strength, Skin:  intact,  Vascular:  capillary refill <2 seconds distally, Sensation:  intact to light touch distally, Tests:  negative Finkelstein's , positive Thumb CMC Grind.      Trigger Exam  Digit: Right ring finger inspection:  no evidence of infection, no erythema, no edema, no ecchymosis, Palpation:  compartments are soft, TTP A1 pulley Range of Motion:  full composite finger flexion, Stability:  no finger instability, Strength:  5/5 strength with flexion and extension, Skin:  intact, Vascular:  capillary refill <2 seconds distally, Sensation:  sensation intact to light touch distally, Other:  no pain with palpation or motion proximally in the wrist.      Constitutional   General appearance: Alert and in no acute distress. Well developed, well nourished.    Eyes   External Eye, Conjunctiva and lids: Normal external exam - pupils were equal in size, round, reactive to light (PERRL) with normal accommodation and extraocular movements intact (EOMI).   Ears, Nose, Mouth, and Throat   Hearing: Normal.   Neck   Neck: No neck mass was observed. Supple.   Pulmonary   Respiratory effort: No respiratory distress.   Cardiovascular   Examination of extremities: No peripheral edema.   Psychiatric   Judgment and insight: Intact.   Orientation to person, place, and time: Alert and oriented x 3.       Mood and affect: Normal.      XR - no fractures, no dislocations, bilateral thumb CMC DJD, and index finger MCP DJD    X-rays were personally reviewed by me. Radiology reports were reviewed by me as well, if available at the time.      Bilateral thumb CMC DJD  Based on the history, physical exam and imaging studies above, the patient's presentation is consistent with the above diagnosis.  I had a long discussion with the patient regarding their presentation and the treatment options.  We discussed initial nonoperative versus operative management options as well as potential further diagnostic imaging.  We again discussed her treatment options going  forward along with their associated risks and benefits. After thorough discussion, the patient has elected to proceed with conservative management. All questions were answered to the patients satisfaction who seems satisfied with the plan.  They will call the office with any questions/concerns.    Discussion I discussed the diagnosis and treatment options with the patient today along with their associated risks and benefits. After thorough discussion, the patient has elected to proceed with a corticosteroid injection with Kenalog and Xylocaine, which was performed in the office today under aseptic technique and the patient tolerated the procedure well.          Ortho Injections:           Injection site bilateral thumb CMC. Medication 1 cc 1% Xylocaine, 1 cc 40 mg Kenalog, Injection given under sterile conditions. No immediate complications noted. Post injection instructions given.  Comfort Cool as needed  Continue diclofenac  Follow-up 16 weeks as needed no x-ray    Bilateral ring finger triggers  Based on the history, physical exam and imaging studies above, the patient's presentation is consistent with the above diagnosis.  I had a long discussion with the patient regarding their presentation and the treatment options.  We discussed initial nonoperative versus operative management options as well as potential further diagnostic imaging.  We again discussed her treatment options going forward along with their associated risks and benefits. After thorough discussion, the patient has elected to proceed with conservative management. All questions were answered to the patients satisfaction who seems satisfied with the plan.  They will call the office with any questions/concerns.    Discussion I discussed the diagnosis and treatment options with the patient today along with their associated risks and benefits. After thorough discussion, the patient has elected to proceed with a corticosteroid injection with Kenalog and  Xylocaine, which was performed in the office today under aseptic technique and the patient tolerated the procedure well.          Ortho Injections:           Injection site bilateral ring finger A1 pulleys. Medication 1 cc 1% Xylocaine, 1 cc 10 mg Kenalog, Injection given under sterile conditions. No immediate complications noted. Post injection instructions given.  Follow-up 8 weeks as needed no x-ray    Patient ID: Sher Cameron is a 69 y.o. male.    Hand / UE Inj/Asp: R ring A1 for trigger finger on 6/5/2025 11:43 AM  Indications: therapeutic  Details: 25 G needle, volar approach  Medications: 10 mg triamcinolone acetonide 10 mg/mL; 1 mL lidocaine 10 mg/mL (1 %)  Outcome: tolerated well, no immediate complications  Procedure, treatment alternatives, risks and benefits explained, specific risks discussed. Consent was given by the patient. Immediately prior to procedure a time out was called to verify the correct patient, procedure, equipment, support staff and site/side marked as required. Patient was prepped and draped in the usual sterile fashion.       S Inj/Asp: bilateral thumb CMC on 6/5/2025 11:43 AM  Indications: pain  Details: 25 G needle (dorsoradial) approach  Medications (Right): 40 mg triamcinolone acetonide 40 mg/mL; 1 mL lidocaine 10 mg/mL (1 %)  Medications (Left): 40 mg triamcinolone acetonide 40 mg/mL; 1 mL lidocaine 10 mg/mL (1 %)  Outcome: tolerated well, no immediate complications  Procedure, treatment alternatives, risks and benefits explained, specific risks discussed. Consent was given by the patient. Immediately prior to procedure a time out was called to verify the correct patient, procedure, equipment, support staff and site/side marked as required. Patient was prepped and draped in the usual sterile fashion.

## 2025-07-02 ENCOUNTER — APPOINTMENT (OUTPATIENT)
Dept: PRIMARY CARE | Facility: CLINIC | Age: 70
End: 2025-07-02
Payer: COMMERCIAL

## 2025-07-02 VITALS
RESPIRATION RATE: 15 BRPM | WEIGHT: 174 LBS | BODY MASS INDEX: 24.97 KG/M2 | HEART RATE: 71 BPM | SYSTOLIC BLOOD PRESSURE: 116 MMHG | DIASTOLIC BLOOD PRESSURE: 75 MMHG | TEMPERATURE: 98.1 F | OXYGEN SATURATION: 96 %

## 2025-07-02 DIAGNOSIS — K42.9 UMBILICAL HERNIA WITHOUT OBSTRUCTION AND WITHOUT GANGRENE: ICD-10-CM

## 2025-07-02 DIAGNOSIS — M17.12 PRIMARY LOCALIZED OSTEOARTHRITIS OF LEFT KNEE: ICD-10-CM

## 2025-07-02 DIAGNOSIS — I10 PRIMARY HYPERTENSION: ICD-10-CM

## 2025-07-02 DIAGNOSIS — K43.2 INCISIONAL HERNIA, WITHOUT OBSTRUCTION OR GANGRENE: ICD-10-CM

## 2025-07-02 DIAGNOSIS — M19.042 OSTEOARTHRITIS OF FINGER OF LEFT HAND: ICD-10-CM

## 2025-07-02 DIAGNOSIS — F32.1 MAJOR DEPRESSIVE DISORDER, SINGLE EPISODE, MODERATE (MULTI): Primary | ICD-10-CM

## 2025-07-02 DIAGNOSIS — E03.8 OTHER SPECIFIED HYPOTHYROIDISM: ICD-10-CM

## 2025-07-02 DIAGNOSIS — E78.49 OTHER HYPERLIPIDEMIA: ICD-10-CM

## 2025-07-02 DIAGNOSIS — F32.1 DEPRESSION, MAJOR, SINGLE EPISODE, MODERATE (MULTI): ICD-10-CM

## 2025-07-02 DIAGNOSIS — R73.03 PREDIABETES: ICD-10-CM

## 2025-07-02 DIAGNOSIS — M19.041 OSTEOARTHRITIS OF FINGER OF RIGHT HAND: ICD-10-CM

## 2025-07-02 DIAGNOSIS — F10.21 ALCOHOL DEPENDENCE, IN REMISSION: ICD-10-CM

## 2025-07-02 LAB
25(OH)D3+25(OH)D2 SERPL-MCNC: 48 NG/ML (ref 30–100)
ALBUMIN SERPL-MCNC: 4.2 G/DL (ref 3.6–5.1)
ALP SERPL-CCNC: 44 U/L (ref 35–144)
ALT SERPL-CCNC: 34 U/L (ref 9–46)
ANION GAP SERPL CALCULATED.4IONS-SCNC: 8 MMOL/L (CALC) (ref 7–17)
AST SERPL-CCNC: 22 U/L (ref 10–35)
BILIRUB SERPL-MCNC: 0.6 MG/DL (ref 0.2–1.2)
BUN SERPL-MCNC: 13 MG/DL (ref 7–25)
CALCIUM SERPL-MCNC: 9.1 MG/DL (ref 8.6–10.3)
CHLORIDE SERPL-SCNC: 103 MMOL/L (ref 98–110)
CHOLEST SERPL-MCNC: 155 MG/DL
CHOLEST/HDLC SERPL: 2.5 (CALC)
CO2 SERPL-SCNC: 29 MMOL/L (ref 20–32)
CREAT SERPL-MCNC: 0.96 MG/DL (ref 0.7–1.28)
EGFRCR SERPLBLD CKD-EPI 2021: 85 ML/MIN/1.73M2
ERYTHROCYTE [DISTWIDTH] IN BLOOD BY AUTOMATED COUNT: 13 % (ref 11–15)
EST. AVERAGE GLUCOSE BLD GHB EST-MCNC: 120 MG/DL
EST. AVERAGE GLUCOSE BLD GHB EST-SCNC: 6.6 MMOL/L
GLUCOSE SERPL-MCNC: 90 MG/DL (ref 65–99)
HBA1C MFR BLD: 5.8 %
HCT VFR BLD AUTO: 46.8 % (ref 38.5–50)
HDLC SERPL-MCNC: 61 MG/DL
HGB BLD-MCNC: 14.8 G/DL (ref 13.2–17.1)
LDLC SERPL CALC-MCNC: 79 MG/DL (CALC)
MCH RBC QN AUTO: 27.4 PG (ref 27–33)
MCHC RBC AUTO-ENTMCNC: 31.6 G/DL (ref 32–36)
MCV RBC AUTO: 86.7 FL (ref 80–100)
NONHDLC SERPL-MCNC: 94 MG/DL (CALC)
PLATELET # BLD AUTO: 182 THOUSAND/UL (ref 140–400)
PMV BLD REES-ECKER: 9.1 FL (ref 7.5–12.5)
POTASSIUM SERPL-SCNC: 4.4 MMOL/L (ref 3.5–5.3)
PROT SERPL-MCNC: 6.6 G/DL (ref 6.1–8.1)
PSA SERPL-MCNC: 0.44 NG/ML
RBC # BLD AUTO: 5.4 MILLION/UL (ref 4.2–5.8)
SODIUM SERPL-SCNC: 140 MMOL/L (ref 135–146)
T4 FREE SERPL-MCNC: 1.2 NG/DL (ref 0.8–1.8)
TRIGL SERPL-MCNC: 73 MG/DL
TSH SERPL-ACNC: 8.18 MIU/L (ref 0.4–4.5)
WBC # BLD AUTO: 7 THOUSAND/UL (ref 3.8–10.8)

## 2025-07-02 PROCEDURE — 99214 OFFICE O/P EST MOD 30 MIN: CPT | Performed by: FAMILY MEDICINE

## 2025-07-02 PROCEDURE — 1160F RVW MEDS BY RX/DR IN RCRD: CPT | Performed by: FAMILY MEDICINE

## 2025-07-02 PROCEDURE — 1159F MED LIST DOCD IN RCRD: CPT | Performed by: FAMILY MEDICINE

## 2025-07-02 PROCEDURE — 3074F SYST BP LT 130 MM HG: CPT | Performed by: FAMILY MEDICINE

## 2025-07-02 PROCEDURE — G2211 COMPLEX E/M VISIT ADD ON: HCPCS | Performed by: FAMILY MEDICINE

## 2025-07-02 PROCEDURE — 3078F DIAST BP <80 MM HG: CPT | Performed by: FAMILY MEDICINE

## 2025-07-02 PROCEDURE — 1036F TOBACCO NON-USER: CPT | Performed by: FAMILY MEDICINE

## 2025-07-02 RX ORDER — ATORVASTATIN CALCIUM 40 MG/1
40 TABLET, FILM COATED ORAL NIGHTLY
Qty: 90 TABLET | Refills: 1 | Status: SHIPPED | OUTPATIENT
Start: 2025-07-02

## 2025-07-02 RX ORDER — SERTRALINE HYDROCHLORIDE 50 MG/1
50 TABLET, FILM COATED ORAL DAILY
Qty: 90 TABLET | Refills: 1 | Status: SHIPPED | OUTPATIENT
Start: 2025-07-02

## 2025-07-02 RX ORDER — DICLOFENAC SODIUM 75 MG/1
75 TABLET, DELAYED RELEASE ORAL 2 TIMES DAILY
Qty: 180 TABLET | Refills: 1 | Status: SHIPPED | OUTPATIENT
Start: 2025-07-02 | End: 2025-12-29

## 2025-07-02 RX ORDER — LEVOTHYROXINE SODIUM 150 UG/1
150 TABLET ORAL DAILY
Qty: 90 TABLET | Refills: 1 | Status: CANCELLED | OUTPATIENT
Start: 2025-07-02 | End: 2026-06-27

## 2025-07-02 RX ORDER — LEVOTHYROXINE SODIUM 175 UG/1
175 TABLET ORAL DAILY
Qty: 90 TABLET | Refills: 1 | Status: SHIPPED | OUTPATIENT
Start: 2025-07-02 | End: 2025-12-29

## 2025-07-02 RX ORDER — LISINOPRIL 20 MG/1
20 TABLET ORAL DAILY
Qty: 90 TABLET | Refills: 1 | Status: SHIPPED | OUTPATIENT
Start: 2025-07-02 | End: 2026-07-02

## 2025-07-02 ASSESSMENT — ENCOUNTER SYMPTOMS
SHORTNESS OF BREATH: 0
ARTHRALGIAS: 0
FEVER: 0
PALPITATIONS: 0
ABDOMINAL PAIN: 0
CONFUSION: 0
CHILLS: 0
CHEST TIGHTNESS: 0

## 2025-07-02 NOTE — ASSESSMENT & PLAN NOTE
TSH elevated patient states he has been taking his levothyroxine 150 mcg on a regular basis we will increase levothyroxine 175 mcg daily recheck TSH in 6 to 8 weeks

## 2025-07-02 NOTE — PROGRESS NOTES
Subjective   Patient ID: Sher Cameron is a 69 y.o. male who presents for Follow-up (4 month).    HPI patient today for follow-up of ongoing healthcare issues and review of blood work for most processes been doing okay he does have though an umbilical hernia as well as an incisional hernia of the abdominal wall he would like to see a surgeon for further evaluation and treatment options he says he still engages in physical activity around the house sometimes he gets some soreness in these areas but denies any nausea vomiting difficulty moving his bowels.    Review of Systems   Constitutional:  Negative for chills and fever.   HENT:  Negative for congestion and ear pain.    Eyes:  Negative for visual disturbance.   Respiratory:  Negative for chest tightness and shortness of breath.    Cardiovascular:  Negative for chest pain and palpitations.   Gastrointestinal:  Negative for abdominal pain.        Abdominal wall incisional and umbilical hernia   Musculoskeletal:  Negative for arthralgias.   Skin:  Negative for pallor.   Psychiatric/Behavioral:  Negative for confusion.        Objective   /75   Pulse 71   Temp 36.7 °C (98.1 °F)   Resp 15   Wt 78.9 kg (174 lb)   SpO2 96%   BMI 24.97 kg/m²     Physical Exam  Vitals and nursing note reviewed.   Constitutional:       General: He is not in acute distress.     Appearance: Normal appearance. He is not ill-appearing.   HENT:      Head: Normocephalic and atraumatic.      Right Ear: Tympanic membrane, ear canal and external ear normal.      Left Ear: Tympanic membrane, ear canal and external ear normal.      Mouth/Throat:      Pharynx: Oropharynx is clear.   Eyes:      Extraocular Movements: Extraocular movements intact.   Cardiovascular:      Rate and Rhythm: Normal rate and regular rhythm.      Pulses: Normal pulses.      Heart sounds: Normal heart sounds.   Pulmonary:      Effort: Pulmonary effort is normal.      Breath sounds: Normal breath sounds.    Abdominal:      General: Abdomen is flat. Bowel sounds are normal.      Palpations: Abdomen is soft.      Tenderness: There is no abdominal tenderness.      Hernia: A hernia is present.      Comments: Easily reducible umbilical hernia and a reducible incisional hernia just to the right of midline of scar   Musculoskeletal:         General: Normal range of motion.      Cervical back: Neck supple.   Skin:     General: Skin is warm.   Neurological:      Mental Status: He is alert and oriented to person, place, and time. Mental status is at baseline.   Psychiatric:         Mood and Affect: Mood normal.       Recent Results (from the past 6 weeks)   CBC    Collection Time: 07/01/25  9:36 AM   Result Value Ref Range    WHITE BLOOD CELL COUNT 7.0 3.8 - 10.8 Thousand/uL    RED BLOOD CELL COUNT 5.40 4.20 - 5.80 Million/uL    HEMOGLOBIN 14.8 13.2 - 17.1 g/dL    HEMATOCRIT 46.8 38.5 - 50.0 %    MCV 86.7 80.0 - 100.0 fL    MCH 27.4 27.0 - 33.0 pg    MCHC 31.6 (L) 32.0 - 36.0 g/dL    RDW 13.0 11.0 - 15.0 %    PLATELET COUNT 182 140 - 400 Thousand/uL    MPV 9.1 7.5 - 12.5 fL   Comprehensive Metabolic Panel    Collection Time: 07/01/25  9:36 AM   Result Value Ref Range    GLUCOSE 90 65 - 99 mg/dL    UREA NITROGEN (BUN) 13 7 - 25 mg/dL    CREATININE 0.96 0.70 - 1.28 mg/dL    EGFR 85 > OR = 60 mL/min/1.73m2    SODIUM 140 135 - 146 mmol/L    POTASSIUM 4.4 3.5 - 5.3 mmol/L    CHLORIDE 103 98 - 110 mmol/L    CARBON DIOXIDE 29 20 - 32 mmol/L    ELECTROLYTE BALANCE 8 7 - 17 mmol/L (calc)    CALCIUM 9.1 8.6 - 10.3 mg/dL    PROTEIN, TOTAL 6.6 6.1 - 8.1 g/dL    ALBUMIN 4.2 3.6 - 5.1 g/dL    BILIRUBIN, TOTAL 0.6 0.2 - 1.2 mg/dL    ALKALINE PHOSPHATASE 44 35 - 144 U/L    AST 22 10 - 35 U/L    ALT 34 9 - 46 U/L   Hemoglobin A1C    Collection Time: 07/01/25  9:36 AM   Result Value Ref Range    HEMOGLOBIN A1c 5.8 (H) <5.7 %    eAG (mg/dL) 120 mg/dL    eAG (mmol/L) 6.6 mmol/L   Lipid Panel    Collection Time: 07/01/25  9:36 AM   Result Value  Ref Range    CHOLESTEROL, TOTAL 155 <200 mg/dL    HDL CHOLESTEROL 61 > OR = 40 mg/dL    TRIGLYCERIDES 73 <150 mg/dL    LDL-CHOLESTEROL 79 mg/dL (calc)    CHOL/HDLC RATIO 2.5 <5.0 (calc)    NON HDL CHOLESTEROL 94 <130 mg/dL (calc)   Vitamin D 25-Hydroxy,Total (for eval of Vitamin D levels)    Collection Time: 07/01/25  9:36 AM   Result Value Ref Range    VITAMIN D,25-OH,TOTAL,IA 48 30 - 100 ng/mL   TSH with reflex to Free T4 if abnormal    Collection Time: 07/01/25  9:36 AM   Result Value Ref Range    TSH W/REFLEX TO FT4 8.18 (H) 0.40 - 4.50 mIU/L    T4, FREE 1.2 0.8 - 1.8 ng/dL   Prostate Specific Antigen, Screen    Collection Time: 07/01/25  9:36 AM   Result Value Ref Range    PSA, TOTAL 0.44 < OR = 4.00 ng/mL     Recent labs reviewed overall numbers are stable except TSH is elevated we will increase levothyroxine 175 mcg daily recheck TSH in approximately 6 to 8 weeks    Referral to general surgeon for further evaluation of incisional umbilical hernia  Continue his other regular medications needed refills provided    Return to our office otherwise in 4 months with repeat fasting labs      Assessment/Plan   Problem List Items Addressed This Visit           ICD-10-CM    Major depressive disorder, single episode, moderate (Multi) - Primary F32.1    Stable continue Zoloft total 150 mg daily         Relevant Medications    sertraline (Zoloft) 50 mg tablet    levothyroxine (Synthroid, Levoxyl) 175 mcg tablet    Other Relevant Orders    Follow Up In Primary Care - Established    Hyperlipidemia E78.5    Stable continue Lipitor 40 mg nightly         Relevant Medications    atorvastatin (Lipitor) 40 mg tablet    Other Relevant Orders    Follow Up In Primary Care - Established    Comprehensive Metabolic Panel    Lipid Panel    Hypertension I10    Stable continue current treatment         Relevant Medications    lisinopril 20 mg tablet    Other Relevant Orders    Follow Up In Primary Care - Established    Comprehensive  Metabolic Panel    Hypothyroidism E03.9    TSH elevated patient states he has been taking his levothyroxine 150 mcg on a regular basis we will increase levothyroxine 175 mcg daily recheck TSH in 6 to 8 weeks         Relevant Medications    levothyroxine (Synthroid, Levoxyl) 175 mcg tablet    Other Relevant Orders    Follow Up In Primary Care - Established    TSH with reflex to Free T4 if abnormal    Comprehensive Metabolic Panel    TSH with reflex to Free T4 if abnormal    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    Recent injections per orthopedics with good results continue Voltaren 75 mg twice daily as needed         Relevant Medications    diclofenac (Voltaren) 75 mg EC tablet    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  Signs and symptoms of incarceration of the hernia reviewed with patient if they should occur he needs to go to the emergency room immediately verbalizes understanding.         Relevant Orders    Referral to General Surgery    Primary localized osteoarthritis of left knee M17.12    Relevant Medications    diclofenac (Voltaren) 75 mg EC tablet    Incisional hernia, without obstruction or gangrene K43.2    Referral for to general surgeon for second opinion         Relevant Orders    Referral to General Surgery     Other Visit Diagnoses         Codes      Depression, major, single episode, moderate (Multi)     F32.1    Relevant Medications    sertraline (Zoloft) 50 mg tablet    levothyroxine (Synthroid, Levoxyl) 175 mcg tablet

## 2025-07-02 NOTE — ASSESSMENT & PLAN NOTE
Referral to general surgeon for second opinion.  Signs and symptoms of incarceration of the hernia reviewed with patient if they should occur he needs to go to the emergency room immediately verbalizes understanding.

## 2025-07-14 ENCOUNTER — APPOINTMENT (OUTPATIENT)
Dept: SURGERY | Facility: CLINIC | Age: 70
End: 2025-07-14
Payer: COMMERCIAL

## 2025-07-14 VITALS
WEIGHT: 173.3 LBS | HEART RATE: 80 BPM | HEIGHT: 70 IN | OXYGEN SATURATION: 94 % | SYSTOLIC BLOOD PRESSURE: 126 MMHG | BODY MASS INDEX: 24.81 KG/M2 | DIASTOLIC BLOOD PRESSURE: 84 MMHG

## 2025-07-14 DIAGNOSIS — K42.9 UMBILICAL HERNIA WITHOUT OBSTRUCTION AND WITHOUT GANGRENE: ICD-10-CM

## 2025-07-14 DIAGNOSIS — K43.2 INCISIONAL HERNIA, WITHOUT OBSTRUCTION OR GANGRENE: Primary | ICD-10-CM

## 2025-07-14 PROCEDURE — 1159F MED LIST DOCD IN RCRD: CPT | Performed by: SURGERY

## 2025-07-14 PROCEDURE — 3074F SYST BP LT 130 MM HG: CPT | Performed by: SURGERY

## 2025-07-14 PROCEDURE — 3008F BODY MASS INDEX DOCD: CPT | Performed by: SURGERY

## 2025-07-14 PROCEDURE — 1036F TOBACCO NON-USER: CPT | Performed by: SURGERY

## 2025-07-14 PROCEDURE — 99203 OFFICE O/P NEW LOW 30 MIN: CPT | Performed by: SURGERY

## 2025-07-14 PROCEDURE — 3079F DIAST BP 80-89 MM HG: CPT | Performed by: SURGERY

## 2025-07-14 ASSESSMENT — ENCOUNTER SYMPTOMS
HEADACHES: 0
DIZZINESS: 0
SHORTNESS OF BREATH: 0
FEVER: 0
PALPITATIONS: 0
CHILLS: 0
COUGH: 0
DIARRHEA: 0
BLOOD IN STOOL: 0
CONSTIPATION: 0
ABDOMINAL PAIN: 0
NAUSEA: 0
VOMITING: 0

## 2025-07-14 NOTE — PROGRESS NOTES
"GENERAL SURGERY CLINIC NOTE    Sher Cameron   1955   [id number]     History Of Present Illness  Sher Cameron is a 69 y.o. male who presents to the office for evaluation of an incisional hernia. He suffered an esophageal perforation in 1999 and underwent emergent laparotomy for repair. Due to lung collapse, he also underwent left thoracotomy. He has noticed the incisional hernia for at least 4 years. It has gotten a bit larger and is generally not painful. His PCP also noted an umbilical hernia.    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     Past Medical History  He has a past medical history of Arthritis, Depression, Diverticulosis, Hyperlipidemia, Hypertension, and Tuberculosis.    Surgical History  He has a past surgical history that includes Eye surgery; Appendectomy; Esophagus surgery; and Hernia repair (Right).  R inguinal hernia repair with mesh early 2000s  Appendectomy as a child (in the 60s)  Ex lap and L thoracotomy for esophageal perforation 1999    Medications  Medications Ordered Prior to Encounter[1]    Allergies  Patient has no known allergies.     Social History  He reports that he has never smoked. He has been exposed to tobacco smoke. He has never used smokeless tobacco. He reports that he does not drink alcohol and does not use drugs.    Family History  Family History[2]     Review of Systems   Constitutional:  Negative for chills and fever.   Respiratory:  Negative for cough and shortness of breath.    Cardiovascular:  Negative for chest pain and palpitations.   Gastrointestinal:  Negative for abdominal pain, blood in stool, constipation, diarrhea, nausea and vomiting.   Neurological:  Negative for dizziness and headaches.   All other systems reviewed and are negative.      Last Recorded Vitals  Blood pressure 126/84, pulse 80, height 1.778 m (5' 10\"), weight 78.6 kg (173 lb 4.8 oz), SpO2 94%.     Physical Exam  Constitutional:  "      General: He is not in acute distress.     Appearance: Normal appearance. He is not ill-appearing.   HENT:      Head: Normocephalic and atraumatic.   Cardiovascular:      Rate and Rhythm: Normal rate and regular rhythm.   Pulmonary:      Effort: Pulmonary effort is normal. No respiratory distress.      Breath sounds: Normal breath sounds.   Abdominal:      General: There is no distension.      Palpations: Abdomen is soft.      Tenderness: There is no abdominal tenderness. There is no guarding.          Comments: Umbilical hernia with reducible contents. Defect 1-1.5cm in size.   Well healed upper midline laparotomy scar as marked. There is incarcerated fatty tissue within the hernia - the fascial defect is unable to be measured  Note: Not all incisions/scars marked on diagram   Musculoskeletal:         General: No swelling.   Skin:     General: Skin is warm and dry.   Neurological:      Mental Status: He is alert and oriented to person, place, and time. Mental status is at baseline.   Psychiatric:         Mood and Affect: Mood normal.         Behavior: Behavior normal.          Relevant Results  None       Assessment and Plan  69 y.o. male with an incisional epigastric and an umbilical hernia who would benefit from elective repair. I would like to obtain imaging to evaluate the fascial defect and the feasibility of placing intra-abdominal mesh. I suspect he will benefit from a laparoscopic repair of the epigastric hernia. I asked him to follow up in the office afterwards to discuss the results and recommendations. He expressed his understanding and all questions were answered.     Gianna Calero MD, [facility]  General Surgery        [1]   Current Outpatient Medications on File Prior to Visit   Medication Sig Dispense Refill    atorvastatin (Lipitor) 40 mg tablet Take 1 tablet (40 mg) by mouth once daily at bedtime. 90 tablet 1    cholecalciferol (Vitamin D-3) 50 mcg (2,000 unit) capsule Take 1 capsule (2,000 Units)  by mouth early in the morning..      diclofenac (Voltaren) 75 mg EC tablet Take 1 tablet (75 mg) by mouth 2 times a day. 180 tablet 1    levothyroxine (Synthroid, Levoxyl) 175 mcg tablet Take 1 tablet (175 mcg) by mouth early in the morning.. Take on an empty stomach at the same time each day, either 30 to 60 minutes prior to breakfast 90 tablet 1    lisinopril 20 mg tablet Take 1 tablet (20 mg) by mouth once daily. 90 tablet 1    sertraline (Zoloft) 100 mg tablet Take 1 tablet (100 mg) by mouth once daily.      sertraline (Zoloft) 50 mg tablet Take 1 tablet (50 mg) by mouth once daily. 90 tablet 1     No current facility-administered medications on file prior to visit.   [2]   Family History  Problem Relation Name Age of Onset    Cancer Mother      Heart disease Father

## 2025-07-28 ENCOUNTER — HOSPITAL ENCOUNTER (OUTPATIENT)
Dept: RADIOLOGY | Facility: HOSPITAL | Age: 70
Discharge: HOME | End: 2025-07-28
Payer: COMMERCIAL

## 2025-07-28 DIAGNOSIS — K43.2 INCISIONAL HERNIA, WITHOUT OBSTRUCTION OR GANGRENE: ICD-10-CM

## 2025-07-28 DIAGNOSIS — K42.9 UMBILICAL HERNIA WITHOUT OBSTRUCTION AND WITHOUT GANGRENE: ICD-10-CM

## 2025-07-28 PROCEDURE — 74150 CT ABDOMEN W/O CONTRAST: CPT

## 2025-08-04 ENCOUNTER — APPOINTMENT (OUTPATIENT)
Dept: SURGERY | Facility: CLINIC | Age: 70
End: 2025-08-04
Payer: COMMERCIAL

## 2025-08-04 VITALS
OXYGEN SATURATION: 97 % | WEIGHT: 175 LBS | BODY MASS INDEX: 25.05 KG/M2 | HEIGHT: 70 IN | DIASTOLIC BLOOD PRESSURE: 77 MMHG | SYSTOLIC BLOOD PRESSURE: 122 MMHG | HEART RATE: 70 BPM

## 2025-08-04 DIAGNOSIS — K42.9 UMBILICAL HERNIA WITHOUT OBSTRUCTION AND WITHOUT GANGRENE: ICD-10-CM

## 2025-08-04 DIAGNOSIS — K43.2 INCISIONAL HERNIA, WITHOUT OBSTRUCTION OR GANGRENE: Primary | ICD-10-CM

## 2025-08-04 PROCEDURE — 99214 OFFICE O/P EST MOD 30 MIN: CPT | Performed by: SURGERY

## 2025-08-04 PROCEDURE — 1036F TOBACCO NON-USER: CPT | Performed by: SURGERY

## 2025-08-04 PROCEDURE — 3078F DIAST BP <80 MM HG: CPT | Performed by: SURGERY

## 2025-08-04 PROCEDURE — 1159F MED LIST DOCD IN RCRD: CPT | Performed by: SURGERY

## 2025-08-04 PROCEDURE — 3008F BODY MASS INDEX DOCD: CPT | Performed by: SURGERY

## 2025-08-04 PROCEDURE — 3074F SYST BP LT 130 MM HG: CPT | Performed by: SURGERY

## 2025-08-04 RX ORDER — CEFAZOLIN SODIUM 2 G/100ML
2 INJECTION, SOLUTION INTRAVENOUS ONCE
OUTPATIENT
Start: 2025-08-04 | End: 2025-08-04

## 2025-08-04 RX ORDER — HEPARIN SODIUM 5000 [USP'U]/ML
5000 INJECTION, SOLUTION INTRAVENOUS; SUBCUTANEOUS ONCE
OUTPATIENT
Start: 2025-08-04 | End: 2025-08-04

## 2025-08-04 ASSESSMENT — ENCOUNTER SYMPTOMS
DIZZINESS: 0
HEADACHES: 0
ABDOMINAL PAIN: 0
CONSTIPATION: 0
COUGH: 0
VOMITING: 0
DIARRHEA: 0
NAUSEA: 0
PALPITATIONS: 0
CHILLS: 0
FEVER: 0
SHORTNESS OF BREATH: 0
BLOOD IN STOOL: 0

## 2025-08-04 NOTE — PROGRESS NOTES
"GENERAL SURGERY CLINIC NOTE    Sher Cameron   1955   [id number]     History Of Present Illness  Sher Cameron is a 70 y.o. male who presents to the office for follow up of an incisional hernia after undergoing CT. He suffered an esophageal perforation in 1999 and underwent emergent laparotomy for repair. Due to lung collapse, he also underwent left thoracotomy. He has noticed the incisional hernia for at least 4 years. It has gotten a bit larger and is generally not painful. His PCP also noted an umbilical hernia.    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     Past Medical History  He has a past medical history of Arthritis, Depression, Diverticulosis, Hyperlipidemia, Hypertension, and Tuberculosis.    Surgical History  He has a past surgical history that includes Eye surgery; Appendectomy; Esophagus surgery; and Hernia repair (Right).  R inguinal hernia repair with mesh early 2000s  Appendectomy as a child (in the 60s)  Ex lap and L thoracotomy for esophageal perforation 1999    Medications  Medications Ordered Prior to Encounter[1]    Allergies  Patient has no known allergies.     Social History  He reports that he has never smoked. He has been exposed to tobacco smoke. He has never used smokeless tobacco. He reports that he does not drink alcohol and does not use drugs.    Family History  Family History[2]     Review of Systems   Constitutional:  Negative for chills and fever.   Respiratory:  Negative for cough and shortness of breath.    Cardiovascular:  Negative for chest pain and palpitations.   Gastrointestinal:  Negative for abdominal pain, blood in stool, constipation, diarrhea, nausea and vomiting.   Neurological:  Negative for dizziness and headaches.   All other systems reviewed and are negative.      Last Recorded Vitals  Blood pressure 122/77, pulse 70, height 1.778 m (5' 10\"), weight 79.4 kg (175 lb), SpO2 97%.     Physical " Exam  Constitutional:       General: He is not in acute distress.     Appearance: Normal appearance. He is not ill-appearing.   HENT:      Head: Normocephalic and atraumatic.     Cardiovascular:      Rate and Rhythm: Normal rate and regular rhythm.   Pulmonary:      Effort: Pulmonary effort is normal. No respiratory distress.      Breath sounds: Normal breath sounds.   Abdominal:      General: There is no distension.      Palpations: Abdomen is soft.      Tenderness: There is no abdominal tenderness. There is no guarding.      Comments: Umbilical hernia with reducible contents. Defect 1-1.5cm in size.   Well healed upper midline laparotomy scar as marked. There is incarcerated fatty tissue within the hernia - the fascial defect is unable to be measured  Note: Not all incisions/scars marked on diagram     Musculoskeletal:         General: No swelling.     Skin:     General: Skin is warm and dry.     Neurological:      Mental Status: He is alert and oriented to person, place, and time. Mental status is at baseline.     Psychiatric:         Mood and Affect: Mood normal.         Behavior: Behavior normal.          Relevant Results    CT abdomen 7/28/25  IMPRESSION:  1.  Fat containing umbilical hernia and at least 3 defects in the  linea Alba between xiphoid and umbilicus with herniated fat, largest  immediately subxiphoid    Assessment and Plan  70 y.o. male with incisional hernias and an umbilical hernia who would benefit from elective repair. I reviewed the CT scan with the patient. Above the umbilical hernia, there appear to be 3 defects in the midline. There are several cm between the top-most defect and the thoracic cavity - there should be plenty of space to secure mesh laparoscopically. I recommend laparoscopic repair with placement of an oval piece of mesh to cover all defects (at least a [id number]). If the adhesions are too severe or challenging to dissect laparoscopically, we can partially or completely repair  this in an open fashion with mesh. The risks and benefits of the procedure were discussed. Risks include allergic reactions, heart or lung complications, bleeding, infection, injury to surrounding tissues (bowel, vessels), mesh infection/issues, hernia formation at incisions, additional planned or unplanned procedures required, nonresolution of symptoms, and pain. The patient expressed understanding and provided informed consent for surgery.    OR for laparoscopic repair of ventral incisional hernias with mesh, possible open. PAT. Obs overnight (given the extent of dissection/incisions required). BL Tap block.    Gianna Calero MD, [facility]  General Surgery          [1]   Current Outpatient Medications on File Prior to Visit   Medication Sig Dispense Refill    atorvastatin (Lipitor) 40 mg tablet Take 1 tablet (40 mg) by mouth once daily at bedtime. 90 tablet 1    cholecalciferol (Vitamin D-3) 50 mcg (2,000 unit) capsule Take 1 capsule (2,000 Units) by mouth early in the morning..      diclofenac (Voltaren) 75 mg EC tablet Take 1 tablet (75 mg) by mouth 2 times a day. 180 tablet 1    levothyroxine (Synthroid, Levoxyl) 175 mcg tablet Take 1 tablet (175 mcg) by mouth early in the morning.. Take on an empty stomach at the same time each day, either 30 to 60 minutes prior to breakfast 90 tablet 1    lisinopril 20 mg tablet Take 1 tablet (20 mg) by mouth once daily. 90 tablet 1    sertraline (Zoloft) 100 mg tablet Take 1 tablet (100 mg) by mouth once daily.      sertraline (Zoloft) 50 mg tablet Take 1 tablet (50 mg) by mouth once daily. 90 tablet 1     No current facility-administered medications on file prior to visit.   [2]   Family History  Problem Relation Name Age of Onset    Cancer Mother      Heart disease Father

## 2025-08-13 DIAGNOSIS — E03.8 OTHER SPECIFIED HYPOTHYROIDISM: ICD-10-CM

## 2025-09-04 ENCOUNTER — PHARMACY VISIT (OUTPATIENT)
Dept: PHARMACY | Facility: CLINIC | Age: 70
End: 2025-09-04
Payer: COMMERCIAL

## 2025-09-04 ENCOUNTER — HOSPITAL ENCOUNTER (OUTPATIENT)
Facility: HOSPITAL | Age: 70
Discharge: HOME | End: 2025-09-04
Attending: SURGERY | Admitting: SURGERY
Payer: COMMERCIAL

## 2025-09-04 ENCOUNTER — ANESTHESIA EVENT (OUTPATIENT)
Dept: OPERATING ROOM | Facility: HOSPITAL | Age: 70
End: 2025-09-04
Payer: COMMERCIAL

## 2025-09-04 ENCOUNTER — ANESTHESIA (OUTPATIENT)
Dept: OPERATING ROOM | Facility: HOSPITAL | Age: 70
End: 2025-09-04
Payer: COMMERCIAL

## 2025-09-04 VITALS
SYSTOLIC BLOOD PRESSURE: 125 MMHG | WEIGHT: 174 LBS | TEMPERATURE: 98.3 F | HEART RATE: 92 BPM | RESPIRATION RATE: 16 BRPM | HEIGHT: 70 IN | OXYGEN SATURATION: 92 % | DIASTOLIC BLOOD PRESSURE: 57 MMHG | BODY MASS INDEX: 24.91 KG/M2

## 2025-09-04 DIAGNOSIS — K42.9 UMBILICAL HERNIA WITHOUT OBSTRUCTION AND WITHOUT GANGRENE: ICD-10-CM

## 2025-09-04 DIAGNOSIS — K43.2 INCISIONAL HERNIA, WITHOUT OBSTRUCTION OR GANGRENE: Primary | ICD-10-CM

## 2025-09-04 PROBLEM — K46.9 HERNIA OF ABDOMINAL CAVITY: Status: ACTIVE | Noted: 2025-09-04

## 2025-09-04 LAB — GLUCOSE BLD MANUAL STRIP-MCNC: 87 MG/DL (ref 74–99)

## 2025-09-04 PROCEDURE — 96372 THER/PROPH/DIAG INJ SC/IM: CPT | Performed by: SURGERY

## 2025-09-04 PROCEDURE — G0378 HOSPITAL OBSERVATION PER HR: HCPCS

## 2025-09-04 PROCEDURE — 3700000002 HC GENERAL ANESTHESIA TIME - EACH INCREMENTAL 1 MINUTE: Performed by: SURGERY

## 2025-09-04 PROCEDURE — 2500000004 HC RX 250 GENERAL PHARMACY W/ HCPCS (ALT 636 FOR OP/ED): Performed by: NURSE ANESTHETIST, CERTIFIED REGISTERED

## 2025-09-04 PROCEDURE — 2720000007 HC OR 272 NO HCPCS: Performed by: SURGERY

## 2025-09-04 PROCEDURE — 2500000004 HC RX 250 GENERAL PHARMACY W/ HCPCS (ALT 636 FOR OP/ED): Performed by: LICENSED PRACTICAL NURSE

## 2025-09-04 PROCEDURE — 2500000005 HC RX 250 GENERAL PHARMACY W/O HCPCS: Performed by: NURSE ANESTHETIST, CERTIFIED REGISTERED

## 2025-09-04 PROCEDURE — 2780000003 HC OR 278 NO HCPCS: Performed by: SURGERY

## 2025-09-04 PROCEDURE — 2500000004 HC RX 250 GENERAL PHARMACY W/ HCPCS (ALT 636 FOR OP/ED): Performed by: SURGERY

## 2025-09-04 PROCEDURE — C1781 MESH (IMPLANTABLE): HCPCS | Performed by: SURGERY

## 2025-09-04 PROCEDURE — 7100000001 HC RECOVERY ROOM TIME - INITIAL BASE CHARGE: Performed by: SURGERY

## 2025-09-04 PROCEDURE — 7100000011 HC EXTENDED STAY RECOVERY HOURLY - NURSING UNIT

## 2025-09-04 PROCEDURE — 2500000005 HC RX 250 GENERAL PHARMACY W/O HCPCS: Performed by: SURGERY

## 2025-09-04 PROCEDURE — 7100000002 HC RECOVERY ROOM TIME - EACH INCREMENTAL 1 MINUTE: Performed by: SURGERY

## 2025-09-04 PROCEDURE — 3600000004 HC OR TIME - INITIAL BASE CHARGE - PROCEDURE LEVEL FOUR: Performed by: SURGERY

## 2025-09-04 PROCEDURE — 3600000009 HC OR TIME - EACH INCREMENTAL 1 MINUTE - PROCEDURE LEVEL FOUR: Performed by: SURGERY

## 2025-09-04 PROCEDURE — 2500000004 HC RX 250 GENERAL PHARMACY W/ HCPCS (ALT 636 FOR OP/ED): Performed by: ANESTHESIOLOGY

## 2025-09-04 PROCEDURE — RXMED WILLOW AMBULATORY MEDICATION CHARGE

## 2025-09-04 PROCEDURE — 3700000001 HC GENERAL ANESTHESIA TIME - INITIAL BASE CHARGE: Performed by: SURGERY

## 2025-09-04 PROCEDURE — 82947 ASSAY GLUCOSE BLOOD QUANT: CPT

## 2025-09-04 DEVICE — IMPLANTABLE DEVICE: Type: IMPLANTABLE DEVICE | Site: ABDOMEN | Status: FUNCTIONAL

## 2025-09-04 RX ORDER — OXYCODONE HYDROCHLORIDE 5 MG/1
5 TABLET ORAL EVERY 6 HOURS PRN
Status: DISCONTINUED | OUTPATIENT
Start: 2025-09-04 | End: 2025-09-04 | Stop reason: HOSPADM

## 2025-09-04 RX ORDER — BUPIVACAINE HYDROCHLORIDE 2.5 MG/ML
INJECTION, SOLUTION EPIDURAL; INFILTRATION; INTRACAUDAL; PERINEURAL AS NEEDED
Status: DISCONTINUED | OUTPATIENT
Start: 2025-09-04 | End: 2025-09-04

## 2025-09-04 RX ORDER — ALBUTEROL SULFATE 0.83 MG/ML
2.5 SOLUTION RESPIRATORY (INHALATION) ONCE AS NEEDED
Status: DISCONTINUED | OUTPATIENT
Start: 2025-09-04 | End: 2025-09-04 | Stop reason: HOSPADM

## 2025-09-04 RX ORDER — SODIUM CHLORIDE 0.9 G/100ML
INJECTION, SOLUTION IRRIGATION AS NEEDED
Status: DISCONTINUED | OUTPATIENT
Start: 2025-09-04 | End: 2025-09-04 | Stop reason: HOSPADM

## 2025-09-04 RX ORDER — OXYCODONE AND ACETAMINOPHEN 5; 325 MG/1; MG/1
1 TABLET ORAL EVERY 4 HOURS PRN
Status: DISCONTINUED | OUTPATIENT
Start: 2025-09-04 | End: 2025-09-04 | Stop reason: HOSPADM

## 2025-09-04 RX ORDER — PROPOFOL 10 MG/ML
INJECTION, EMULSION INTRAVENOUS AS NEEDED
Status: DISCONTINUED | OUTPATIENT
Start: 2025-09-04 | End: 2025-09-04

## 2025-09-04 RX ORDER — HEPARIN SODIUM 5000 [USP'U]/ML
5000 INJECTION, SOLUTION INTRAVENOUS; SUBCUTANEOUS EVERY 8 HOURS
Status: DISCONTINUED | OUTPATIENT
Start: 2025-09-04 | End: 2025-09-04 | Stop reason: HOSPADM

## 2025-09-04 RX ORDER — LIDOCAINE HYDROCHLORIDE 20 MG/ML
INJECTION, SOLUTION INFILTRATION; PERINEURAL AS NEEDED
Status: DISCONTINUED | OUTPATIENT
Start: 2025-09-04 | End: 2025-09-04

## 2025-09-04 RX ORDER — MORPHINE SULFATE 2 MG/ML
2 INJECTION, SOLUTION INTRAMUSCULAR; INTRAVENOUS EVERY 5 MIN PRN
Status: DISCONTINUED | OUTPATIENT
Start: 2025-09-04 | End: 2025-09-04 | Stop reason: HOSPADM

## 2025-09-04 RX ORDER — ACETAMINOPHEN 325 MG/1
975 TABLET ORAL EVERY 8 HOURS
Status: DISCONTINUED | OUTPATIENT
Start: 2025-09-04 | End: 2025-09-04 | Stop reason: HOSPADM

## 2025-09-04 RX ORDER — LABETALOL HYDROCHLORIDE 5 MG/ML
5 INJECTION, SOLUTION INTRAVENOUS ONCE AS NEEDED
Status: DISCONTINUED | OUTPATIENT
Start: 2025-09-04 | End: 2025-09-04 | Stop reason: HOSPADM

## 2025-09-04 RX ORDER — CEFAZOLIN SODIUM 2 G/50ML
2 SOLUTION INTRAVENOUS ONCE
Status: COMPLETED | OUTPATIENT
Start: 2025-09-04 | End: 2025-09-04

## 2025-09-04 RX ORDER — PHENYLEPHRINE HCL IN 0.9% NACL 1 MG/10 ML
SYRINGE (ML) INTRAVENOUS AS NEEDED
Status: DISCONTINUED | OUTPATIENT
Start: 2025-09-04 | End: 2025-09-04

## 2025-09-04 RX ORDER — FAMOTIDINE 10 MG/ML
20 INJECTION, SOLUTION INTRAVENOUS ONCE
Status: COMPLETED | OUTPATIENT
Start: 2025-09-04 | End: 2025-09-04

## 2025-09-04 RX ORDER — HYDROMORPHONE HYDROCHLORIDE 1 MG/ML
INJECTION, SOLUTION INTRAMUSCULAR; INTRAVENOUS; SUBCUTANEOUS AS NEEDED
Status: DISCONTINUED | OUTPATIENT
Start: 2025-09-04 | End: 2025-09-04

## 2025-09-04 RX ORDER — SERTRALINE HYDROCHLORIDE 50 MG/1
50 TABLET, FILM COATED ORAL DAILY
Status: DISCONTINUED | OUTPATIENT
Start: 2025-09-05 | End: 2025-09-04 | Stop reason: HOSPADM

## 2025-09-04 RX ORDER — OXYCODONE HYDROCHLORIDE 5 MG/1
5 TABLET ORAL EVERY 6 HOURS PRN
Qty: 10 TABLET | Refills: 0 | Status: SHIPPED | OUTPATIENT
Start: 2025-09-04

## 2025-09-04 RX ORDER — ATORVASTATIN CALCIUM 40 MG/1
40 TABLET, FILM COATED ORAL NIGHTLY
Status: DISCONTINUED | OUTPATIENT
Start: 2025-09-04 | End: 2025-09-04 | Stop reason: HOSPADM

## 2025-09-04 RX ORDER — ONDANSETRON HYDROCHLORIDE 2 MG/ML
4 INJECTION, SOLUTION INTRAVENOUS ONCE AS NEEDED
Status: DISCONTINUED | OUTPATIENT
Start: 2025-09-04 | End: 2025-09-04 | Stop reason: HOSPADM

## 2025-09-04 RX ORDER — POLYETHYLENE GLYCOL 3350 17 G/17G
17 POWDER, FOR SOLUTION ORAL DAILY
Status: DISCONTINUED | OUTPATIENT
Start: 2025-09-04 | End: 2025-09-04 | Stop reason: HOSPADM

## 2025-09-04 RX ORDER — HEPARIN SODIUM 5000 [USP'U]/ML
5000 INJECTION, SOLUTION INTRAVENOUS; SUBCUTANEOUS ONCE
Status: COMPLETED | OUTPATIENT
Start: 2025-09-04 | End: 2025-09-04

## 2025-09-04 RX ORDER — DROPERIDOL 2.5 MG/ML
0.62 INJECTION, SOLUTION INTRAMUSCULAR; INTRAVENOUS ONCE AS NEEDED
Status: DISCONTINUED | OUTPATIENT
Start: 2025-09-04 | End: 2025-09-04 | Stop reason: HOSPADM

## 2025-09-04 RX ORDER — ROCURONIUM BROMIDE 10 MG/ML
INJECTION, SOLUTION INTRAVENOUS AS NEEDED
Status: DISCONTINUED | OUTPATIENT
Start: 2025-09-04 | End: 2025-09-04

## 2025-09-04 RX ORDER — SODIUM CHLORIDE, SODIUM LACTATE, POTASSIUM CHLORIDE, CALCIUM CHLORIDE 600; 310; 30; 20 MG/100ML; MG/100ML; MG/100ML; MG/100ML
100 INJECTION, SOLUTION INTRAVENOUS CONTINUOUS
Status: DISCONTINUED | OUTPATIENT
Start: 2025-09-04 | End: 2025-09-04 | Stop reason: HOSPADM

## 2025-09-04 RX ORDER — HYDRALAZINE HYDROCHLORIDE 20 MG/ML
5 INJECTION INTRAMUSCULAR; INTRAVENOUS EVERY 30 MIN PRN
Status: DISCONTINUED | OUTPATIENT
Start: 2025-09-04 | End: 2025-09-04 | Stop reason: HOSPADM

## 2025-09-04 RX ORDER — POLYETHYLENE GLYCOL 3350 17 G/17G
17 POWDER, FOR SOLUTION ORAL DAILY
Qty: 10 PACKET | Refills: 0 | Status: SHIPPED | OUTPATIENT
Start: 2025-09-04

## 2025-09-04 RX ORDER — SODIUM CHLORIDE, SODIUM LACTATE, POTASSIUM CHLORIDE, CALCIUM CHLORIDE 600; 310; 30; 20 MG/100ML; MG/100ML; MG/100ML; MG/100ML
75 INJECTION, SOLUTION INTRAVENOUS CONTINUOUS
Status: ACTIVE | OUTPATIENT
Start: 2025-09-04 | End: 2025-09-04

## 2025-09-04 RX ORDER — LIDOCAINE HYDROCHLORIDE 10 MG/ML
0.1 INJECTION, SOLUTION EPIDURAL; INFILTRATION; INTRACAUDAL; PERINEURAL ONCE
Status: DISCONTINUED | OUTPATIENT
Start: 2025-09-04 | End: 2025-09-04 | Stop reason: HOSPADM

## 2025-09-04 RX ORDER — FENTANYL CITRATE 50 UG/ML
INJECTION, SOLUTION INTRAMUSCULAR; INTRAVENOUS AS NEEDED
Status: DISCONTINUED | OUTPATIENT
Start: 2025-09-04 | End: 2025-09-04

## 2025-09-04 RX ORDER — ONDANSETRON HYDROCHLORIDE 2 MG/ML
4 INJECTION, SOLUTION INTRAVENOUS EVERY 8 HOURS PRN
Status: DISCONTINUED | OUTPATIENT
Start: 2025-09-04 | End: 2025-09-04 | Stop reason: HOSPADM

## 2025-09-04 RX ORDER — ONDANSETRON 4 MG/1
4 TABLET, ORALLY DISINTEGRATING ORAL EVERY 8 HOURS PRN
Status: DISCONTINUED | OUTPATIENT
Start: 2025-09-04 | End: 2025-09-04 | Stop reason: HOSPADM

## 2025-09-04 RX ORDER — MEPERIDINE HYDROCHLORIDE 25 MG/ML
12.5 INJECTION INTRAMUSCULAR; INTRAVENOUS; SUBCUTANEOUS EVERY 10 MIN PRN
Status: DISCONTINUED | OUTPATIENT
Start: 2025-09-04 | End: 2025-09-04 | Stop reason: HOSPADM

## 2025-09-04 RX ORDER — DIPHENHYDRAMINE HYDROCHLORIDE 50 MG/ML
12.5 INJECTION, SOLUTION INTRAMUSCULAR; INTRAVENOUS ONCE AS NEEDED
Status: DISCONTINUED | OUTPATIENT
Start: 2025-09-04 | End: 2025-09-04 | Stop reason: HOSPADM

## 2025-09-04 RX ORDER — LISINOPRIL 10 MG/1
20 TABLET ORAL DAILY
Status: DISCONTINUED | OUTPATIENT
Start: 2025-09-05 | End: 2025-09-04 | Stop reason: HOSPADM

## 2025-09-04 RX ADMIN — DEXAMETHASONE SODIUM PHOSPHATE 4 MG: 4 INJECTION INTRA-ARTICULAR; INTRALESIONAL; INTRAMUSCULAR; INTRAVENOUS; SOFT TISSUE at 08:03

## 2025-09-04 RX ADMIN — HYDROMORPHONE HYDROCHLORIDE 0.5 MG: 1 INJECTION, SOLUTION INTRAMUSCULAR; INTRAVENOUS; SUBCUTANEOUS at 11:35

## 2025-09-04 RX ADMIN — EPHEDRINE SULFATE 10 MG: 50 INJECTION, SOLUTION INTRAVENOUS at 09:49

## 2025-09-04 RX ADMIN — BUPIVACAINE HYDROCHLORIDE 30 ML: 2.5 INJECTION, SOLUTION EPIDURAL; INFILTRATION; INTRACAUDAL; PERINEURAL at 08:05

## 2025-09-04 RX ADMIN — FENTANYL CITRATE 50 MCG: 50 INJECTION INTRAMUSCULAR; INTRAVENOUS at 07:56

## 2025-09-04 RX ADMIN — HYDROMORPHONE HYDROCHLORIDE 0.5 MG: 1 INJECTION, SOLUTION INTRAMUSCULAR; INTRAVENOUS; SUBCUTANEOUS at 11:51

## 2025-09-04 RX ADMIN — BUPIVACAINE HYDROCHLORIDE 30 ML: 2.5 INJECTION, SOLUTION EPIDURAL; INFILTRATION; INTRACAUDAL; PERINEURAL at 08:03

## 2025-09-04 RX ADMIN — HYDROMORPHONE HYDROCHLORIDE 0.5 MG: 1 INJECTION INTRAMUSCULAR; INTRAVENOUS; SUBCUTANEOUS at 11:12

## 2025-09-04 RX ADMIN — FAMOTIDINE 20 MG: 10 INJECTION, SOLUTION INTRAVENOUS at 07:24

## 2025-09-04 RX ADMIN — HEPARIN SODIUM 5000 UNITS: 5000 INJECTION, SOLUTION INTRAVENOUS; SUBCUTANEOUS at 07:18

## 2025-09-04 RX ADMIN — FENTANYL CITRATE 50 MCG: 50 INJECTION INTRAMUSCULAR; INTRAVENOUS at 08:01

## 2025-09-04 RX ADMIN — Medication 100 MCG: at 09:38

## 2025-09-04 RX ADMIN — LIDOCAINE HYDROCHLORIDE 60 MG: 20 INJECTION, SOLUTION INFILTRATION; PERINEURAL at 08:02

## 2025-09-04 RX ADMIN — SODIUM CHLORIDE, SODIUM LACTATE, POTASSIUM CHLORIDE, AND CALCIUM CHLORIDE 75 ML/HR: .6; .31; .03; .02 INJECTION, SOLUTION INTRAVENOUS at 07:24

## 2025-09-04 RX ADMIN — CEFAZOLIN SODIUM 2 G: 2 SOLUTION INTRAVENOUS at 07:51

## 2025-09-04 RX ADMIN — PROPOFOL 50 MG: 10 INJECTION, EMULSION INTRAVENOUS at 08:04

## 2025-09-04 RX ADMIN — Medication 100 MCG: at 08:17

## 2025-09-04 RX ADMIN — HYDROMORPHONE HYDROCHLORIDE 0.5 MG: 1 INJECTION INTRAMUSCULAR; INTRAVENOUS; SUBCUTANEOUS at 11:10

## 2025-09-04 RX ADMIN — ROCURONIUM BROMIDE 50 MG: 10 INJECTION, SOLUTION INTRAVENOUS at 08:02

## 2025-09-04 RX ADMIN — SUGAMMADEX 200 MG: 100 INJECTION, SOLUTION INTRAVENOUS at 11:06

## 2025-09-04 RX ADMIN — Medication 100 MCG: at 09:23

## 2025-09-04 RX ADMIN — PROPOFOL 150 MG: 10 INJECTION, EMULSION INTRAVENOUS at 08:02

## 2025-09-04 SDOH — SOCIAL STABILITY: SOCIAL INSECURITY: ARE YOU OR HAVE YOU BEEN THREATENED OR ABUSED PHYSICALLY, EMOTIONALLY, OR SEXUALLY BY ANYONE?: NO

## 2025-09-04 SDOH — ECONOMIC STABILITY: HOUSING INSECURITY: IN THE LAST 12 MONTHS, WAS THERE A TIME WHEN YOU WERE NOT ABLE TO PAY THE MORTGAGE OR RENT ON TIME?: PATIENT DECLINED

## 2025-09-04 SDOH — SOCIAL STABILITY: SOCIAL INSECURITY: DOES ANYONE TRY TO KEEP YOU FROM HAVING/CONTACTING OTHER FRIENDS OR DOING THINGS OUTSIDE YOUR HOME?: NO

## 2025-09-04 SDOH — SOCIAL STABILITY: SOCIAL INSECURITY: DO YOU FEEL UNSAFE GOING BACK TO THE PLACE WHERE YOU ARE LIVING?: NO

## 2025-09-04 SDOH — ECONOMIC STABILITY: INCOME INSECURITY
IN THE PAST 12 MONTHS HAS THE ELECTRIC, GAS, OIL, OR WATER COMPANY THREATENED TO SHUT OFF SERVICES IN YOUR HOME?: PATIENT DECLINED

## 2025-09-04 SDOH — HEALTH STABILITY: MENTAL HEALTH: CURRENT SMOKER: 0

## 2025-09-04 SDOH — SOCIAL STABILITY: SOCIAL INSECURITY: HAVE YOU HAD ANY THOUGHTS OF HARMING ANYONE ELSE?: NO

## 2025-09-04 SDOH — SOCIAL STABILITY: SOCIAL INSECURITY: WITHIN THE LAST YEAR, HAVE YOU BEEN HUMILIATED OR EMOTIONALLY ABUSED IN OTHER WAYS BY YOUR PARTNER OR EX-PARTNER?: NO

## 2025-09-04 SDOH — ECONOMIC STABILITY: HOUSING INSECURITY: IN THE PAST 12 MONTHS, HOW MANY TIMES HAVE YOU MOVED WHERE YOU WERE LIVING?: 0

## 2025-09-04 SDOH — SOCIAL STABILITY: SOCIAL INSECURITY
WITHIN THE LAST YEAR, HAVE YOU BEEN RAPED OR FORCED TO HAVE ANY KIND OF SEXUAL ACTIVITY BY YOUR PARTNER OR EX-PARTNER?: NO

## 2025-09-04 SDOH — ECONOMIC STABILITY: HOUSING INSECURITY: AT ANY TIME IN THE PAST 12 MONTHS, WERE YOU HOMELESS OR LIVING IN A SHELTER (INCLUDING NOW)?: PATIENT DECLINED

## 2025-09-04 SDOH — SOCIAL STABILITY: SOCIAL INSECURITY
WITHIN THE LAST YEAR, HAVE YOU BEEN KICKED, HIT, SLAPPED, OR OTHERWISE PHYSICALLY HURT BY YOUR PARTNER OR EX-PARTNER?: NO

## 2025-09-04 SDOH — HEALTH STABILITY: PHYSICAL HEALTH
HOW OFTEN DO YOU NEED TO HAVE SOMEONE HELP YOU WHEN YOU READ INSTRUCTIONS, PAMPHLETS, OR OTHER WRITTEN MATERIAL FROM YOUR DOCTOR OR PHARMACY?: PATIENT DECLINES TO RESPOND

## 2025-09-04 SDOH — SOCIAL STABILITY: SOCIAL INSECURITY
ASK PARENT OR GUARDIAN: ARE THERE TIMES WHEN YOU, YOUR CHILD(REN), OR ANY MEMBER OF YOUR HOUSEHOLD FEEL UNSAFE, HARMED, OR THREATENED AROUND PERSONS WITH WHOM YOU KNOW OR LIVE?: NO

## 2025-09-04 SDOH — SOCIAL STABILITY: SOCIAL INSECURITY: WITHIN THE LAST YEAR, HAVE YOU BEEN AFRAID OF YOUR PARTNER OR EX-PARTNER?: NO

## 2025-09-04 SDOH — SOCIAL STABILITY: SOCIAL INSECURITY: WERE YOU ABLE TO COMPLETE ALL THE BEHAVIORAL HEALTH SCREENINGS?: YES

## 2025-09-04 SDOH — ECONOMIC STABILITY: TRANSPORTATION INSECURITY
IN THE PAST 12 MONTHS, HAS LACK OF TRANSPORTATION KEPT YOU FROM MEDICAL APPOINTMENTS OR FROM GETTING MEDICATIONS?: PATIENT DECLINED

## 2025-09-04 SDOH — ECONOMIC STABILITY: FOOD INSECURITY: HOW HARD IS IT FOR YOU TO PAY FOR THE VERY BASICS LIKE FOOD, HOUSING, MEDICAL CARE, AND HEATING?: PATIENT DECLINED

## 2025-09-04 SDOH — ECONOMIC STABILITY: FOOD INSECURITY: WITHIN THE PAST 12 MONTHS, THE FOOD YOU BOUGHT JUST DIDN'T LAST AND YOU DIDN'T HAVE MONEY TO GET MORE.: PATIENT DECLINED

## 2025-09-04 SDOH — SOCIAL STABILITY: SOCIAL INSECURITY: ABUSE: ADULT

## 2025-09-04 SDOH — HEALTH STABILITY: PHYSICAL HEALTH: ON AVERAGE, HOW MANY MINUTES DO YOU ENGAGE IN EXERCISE AT THIS LEVEL?: PATIENT DECLINED

## 2025-09-04 SDOH — SOCIAL STABILITY: SOCIAL INSECURITY: ARE THERE ANY APPARENT SIGNS OF INJURIES/BEHAVIORS THAT COULD BE RELATED TO ABUSE/NEGLECT?: NO

## 2025-09-04 SDOH — ECONOMIC STABILITY: FOOD INSECURITY
WITHIN THE PAST 12 MONTHS, YOU WORRIED THAT YOUR FOOD WOULD RUN OUT BEFORE YOU GOT THE MONEY TO BUY MORE.: PATIENT DECLINED

## 2025-09-04 SDOH — SOCIAL STABILITY: SOCIAL INSECURITY: DO YOU FEEL ANYONE HAS EXPLOITED OR TAKEN ADVANTAGE OF YOU FINANCIALLY OR OF YOUR PERSONAL PROPERTY?: NO

## 2025-09-04 SDOH — SOCIAL STABILITY: SOCIAL INSECURITY: HAS ANYONE EVER THREATENED TO HURT YOUR FAMILY OR YOUR PETS?: NO

## 2025-09-04 SDOH — SOCIAL STABILITY: SOCIAL INSECURITY: HAVE YOU HAD THOUGHTS OF HARMING ANYONE ELSE?: NO

## 2025-09-04 SDOH — HEALTH STABILITY: PHYSICAL HEALTH
ON AVERAGE, HOW MANY DAYS PER WEEK DO YOU ENGAGE IN MODERATE TO STRENUOUS EXERCISE (LIKE A BRISK WALK)?: PATIENT DECLINED

## 2025-09-04 SDOH — ECONOMIC STABILITY: HOUSING INSECURITY: DO YOU FEEL UNSAFE GOING BACK TO THE PLACE WHERE YOU LIVE?: NO

## 2025-09-04 ASSESSMENT — ENCOUNTER SYMPTOMS
COLOR CHANGE: 0
CHILLS: 0
SORE THROAT: 0
FREQUENCY: 0
SHORTNESS OF BREATH: 0
WEAKNESS: 0
DIARRHEA: 0
BACK PAIN: 0
NECK PAIN: 0
FEVER: 0
DIZZINESS: 0
ABDOMINAL DISTENTION: 0
ARTHRALGIAS: 0
EYE DISCHARGE: 0
NAUSEA: 0
DYSURIA: 0
PALPITATIONS: 0
SPEECH DIFFICULTY: 0
ABDOMINAL PAIN: 0
EYE REDNESS: 0
VOMITING: 0
TROUBLE SWALLOWING: 0
CONSTIPATION: 0
LIGHT-HEADEDNESS: 0
ROS GI COMMENTS: HERNIA
COUGH: 0
DIFFICULTY URINATING: 0

## 2025-09-04 ASSESSMENT — ACTIVITIES OF DAILY LIVING (ADL)
BATHING: INDEPENDENT
ADEQUATE_TO_COMPLETE_ADL: YES
GROOMING: INDEPENDENT
DRESSING YOURSELF: INDEPENDENT
LACK_OF_TRANSPORTATION: PATIENT DECLINED
FEEDING YOURSELF: INDEPENDENT
PATIENT'S MEMORY ADEQUATE TO SAFELY COMPLETE DAILY ACTIVITIES?: YES
JUDGMENT_ADEQUATE_SAFELY_COMPLETE_DAILY_ACTIVITIES: YES
HEARING - LEFT EAR: FUNCTIONAL
LACK_OF_TRANSPORTATION: PATIENT DECLINED
HEARING - RIGHT EAR: FUNCTIONAL
TOILETING: INDEPENDENT
WALKS IN HOME: INDEPENDENT

## 2025-09-04 ASSESSMENT — COGNITIVE AND FUNCTIONAL STATUS - GENERAL
CLIMB 3 TO 5 STEPS WITH RAILING: A LITTLE
DAILY ACTIVITIY SCORE: 24
PATIENT BASELINE BEDBOUND: NO
MOBILITY SCORE: 20
DAILY ACTIVITIY SCORE: 24
CLIMB 3 TO 5 STEPS WITH RAILING: A LITTLE
STANDING UP FROM CHAIR USING ARMS: A LITTLE
STANDING UP FROM CHAIR USING ARMS: A LITTLE
MOVING TO AND FROM BED TO CHAIR: A LITTLE
MOVING TO AND FROM BED TO CHAIR: A LITTLE
CLIMB 3 TO 5 STEPS WITH RAILING: A LITTLE
DAILY ACTIVITIY SCORE: 24
MOBILITY SCORE: 20
WALKING IN HOSPITAL ROOM: A LITTLE
WALKING IN HOSPITAL ROOM: A LITTLE
MOBILITY SCORE: 20
MOVING TO AND FROM BED TO CHAIR: A LITTLE
STANDING UP FROM CHAIR USING ARMS: A LITTLE
WALKING IN HOSPITAL ROOM: A LITTLE

## 2025-09-04 ASSESSMENT — LIFESTYLE VARIABLES
SKIP TO QUESTIONS 9-10: 1
HOW OFTEN DO YOU HAVE 6 OR MORE DRINKS ON ONE OCCASION: NEVER
HOW MANY STANDARD DRINKS CONTAINING ALCOHOL DO YOU HAVE ON A TYPICAL DAY: PATIENT DOES NOT DRINK
AUDIT-C TOTAL SCORE: 0
AUDIT-C TOTAL SCORE: 0
HOW OFTEN DO YOU HAVE A DRINK CONTAINING ALCOHOL: NEVER

## 2025-09-04 ASSESSMENT — PAIN SCALES - GENERAL
PAINLEVEL_OUTOF10: 8
PAINLEVEL_OUTOF10: 1
PAINLEVEL_OUTOF10: 0 - NO PAIN
PAIN_LEVEL: 5
PAINLEVEL_OUTOF10: 7
PAINLEVEL_OUTOF10: 5 - MODERATE PAIN
PAINLEVEL_OUTOF10: 6

## 2025-09-04 ASSESSMENT — PAIN DESCRIPTION - LOCATION
LOCATION: UMBILICUS
LOCATION: UMBILICUS

## 2025-09-04 ASSESSMENT — PAIN - FUNCTIONAL ASSESSMENT
PAIN_FUNCTIONAL_ASSESSMENT: 0-10

## 2025-09-04 ASSESSMENT — PATIENT HEALTH QUESTIONNAIRE - PHQ9
1. LITTLE INTEREST OR PLEASURE IN DOING THINGS: NOT AT ALL
SUM OF ALL RESPONSES TO PHQ9 QUESTIONS 1 & 2: 0
2. FEELING DOWN, DEPRESSED OR HOPELESS: NOT AT ALL

## 2025-09-04 ASSESSMENT — PAIN DESCRIPTION - DESCRIPTORS: DESCRIPTORS: PRESSURE

## 2025-09-05 ENCOUNTER — PHARMACY VISIT (OUTPATIENT)
Dept: PHARMACY | Facility: CLINIC | Age: 70
End: 2025-09-05
Payer: COMMERCIAL

## 2025-09-05 PROCEDURE — RXMED WILLOW AMBULATORY MEDICATION CHARGE

## 2025-09-05 RX ORDER — NALOXONE HYDROCHLORIDE 4 MG/.1ML
SPRAY NASAL
Qty: 2 EACH | Refills: 0 | OUTPATIENT
Start: 2025-09-05

## 2025-09-15 ENCOUNTER — APPOINTMENT (OUTPATIENT)
Dept: SURGERY | Facility: CLINIC | Age: 70
End: 2025-09-15
Payer: COMMERCIAL

## 2025-11-05 ENCOUNTER — APPOINTMENT (OUTPATIENT)
Dept: PRIMARY CARE | Facility: CLINIC | Age: 70
End: 2025-11-05
Payer: COMMERCIAL

## (undated) DEVICE — STAPLER, ABSORBABLE SORBAFIX 30

## (undated) DEVICE — APPLICATOR, CHLORAPREP, W/ORANGE TINT, 26ML

## (undated) DEVICE — DRAPE, SHEET, ENDOSCOPY, GENERAL, FENESTRATED, ARMBOARD COVER, 98 X 123.5 IN, DISPOSABLE, LF, STERILE

## (undated) DEVICE — GLOVE, SURGICAL, PROTEXIS PI BLUE W/NEUTHERA, 6.5, PF, LF

## (undated) DEVICE — PAD, GROUNDING, ELECTROSURGICAL, W/9 FT CABLE, POLYHESIVE II, ADULT, LF

## (undated) DEVICE — TISSUE ADHESIVE, EXOFIN, 1ML

## (undated) DEVICE — GLOVE, PROTEXIS PI CLASSIC, SZ-6.5, PF, LF

## (undated) DEVICE — ADHESIVE, SKIN, DERMABOND ADVANCED, 15CM, PEN-STYLE

## (undated) DEVICE — SCOPE WARMER, LAPAROSCOPE, BAG ONLY, LF

## (undated) DEVICE — BINDER, ABDOMINAL, 3 PANEL, 9 X 46-62 IN, MED/LG

## (undated) DEVICE — TROCAR, KII OPTICAL BLADELESS 5MM Z THREAD 100MM LNGTH

## (undated) DEVICE — SUTURE, VICRYL, 4-0, 18 IN, UNDYED BR PS-2

## (undated) DEVICE — SUTURE, PROLENE, 0, 36 IN, V7, DA, BLUE

## (undated) DEVICE — SLEEVE, KII, Z-THREAD, 5X100CM

## (undated) DEVICE — SOLUTION, IRRIGATION, SODIUM CHLORIDE 0.9%, 1000 ML, POUR BOTTLE

## (undated) DEVICE — TROCAR, BLUNT TIP, KII, W/SEAL, 12MM X 100MM

## (undated) DEVICE — NEEDLE, INSUFFLATION, 13GAX120MM, DISP

## (undated) DEVICE — SYRINGE, 10 CC, LUER LOCK

## (undated) DEVICE — LIGASURE, SEALER/DIVIDER MARYLAND JAW, 5MM

## (undated) DEVICE — SUTURE, VICRYL PLUS 3-0, SH, 27IN

## (undated) DEVICE — SUTURE, VICRYL, 2-0, 27 IN, SH, UNDYED

## (undated) DEVICE — NEEDLE, SAFETY, 25 GA X 1.5 IN

## (undated) DEVICE — SCISSOR TIP, ENDOCUT, LAPAROSCOPIC

## (undated) DEVICE — Device

## (undated) DEVICE — COVER HANDLE LIGHT, STERIS, BLUE, STERILE

## (undated) DEVICE — SUTURE, VICRYL PLUS, 0, 27IN, UR-6, VIOLET, BRAIDED